# Patient Record
Sex: MALE | Race: WHITE | NOT HISPANIC OR LATINO | Employment: OTHER | URBAN - METROPOLITAN AREA
[De-identification: names, ages, dates, MRNs, and addresses within clinical notes are randomized per-mention and may not be internally consistent; named-entity substitution may affect disease eponyms.]

---

## 2017-05-23 ENCOUNTER — APPOINTMENT (OUTPATIENT)
Dept: CT IMAGING | Facility: HOSPITAL | Age: 52
End: 2017-05-23

## 2017-05-23 ENCOUNTER — HOSPITAL ENCOUNTER (INPATIENT)
Facility: HOSPITAL | Age: 52
LOS: 2 days | Discharge: HOME | End: 2017-05-26
Attending: EMERGENCY MEDICINE | Admitting: FAMILY MEDICINE

## 2017-05-23 DIAGNOSIS — R11.2 NAUSEA, VOMITING, AND DIARRHEA: ICD-10-CM

## 2017-05-23 DIAGNOSIS — D72.825 BANDEMIA: ICD-10-CM

## 2017-05-23 DIAGNOSIS — K57.32 DIVERTICULITIS OF SIGMOID COLON: Primary | ICD-10-CM

## 2017-05-23 DIAGNOSIS — R19.7 NAUSEA, VOMITING, AND DIARRHEA: ICD-10-CM

## 2017-05-23 LAB
ALBUMIN SERPL-MCNC: 4.7 G/DL (ref 3.2–4.8)
ALBUMIN/GLOB SERPL: 1.4 G/DL (ref 1.5–2.5)
ALP SERPL-CCNC: 73 U/L (ref 25–100)
ALT SERPL W P-5'-P-CCNC: 42 U/L (ref 7–40)
ANION GAP SERPL CALCULATED.3IONS-SCNC: 6 MMOL/L (ref 3–11)
AST SERPL-CCNC: 38 U/L (ref 0–33)
BASOPHILS # BLD AUTO: 0.03 10*3/MM3 (ref 0–0.2)
BASOPHILS NFR BLD AUTO: 0.2 % (ref 0–1)
BILIRUB SERPL-MCNC: 0.6 MG/DL (ref 0.3–1.2)
BUN BLD-MCNC: 16 MG/DL (ref 9–23)
BUN/CREAT SERPL: 20 (ref 7–25)
CALCIUM SPEC-SCNC: 9.7 MG/DL (ref 8.7–10.4)
CHLORIDE SERPL-SCNC: 102 MMOL/L (ref 99–109)
CO2 SERPL-SCNC: 30 MMOL/L (ref 20–31)
CREAT BLD-MCNC: 0.8 MG/DL (ref 0.6–1.3)
DEPRECATED RDW RBC AUTO: 42.7 FL (ref 37–54)
EOSINOPHIL # BLD AUTO: 0.07 10*3/MM3 (ref 0.1–0.3)
EOSINOPHIL NFR BLD AUTO: 0.4 % (ref 0–3)
ERYTHROCYTE [DISTWIDTH] IN BLOOD BY AUTOMATED COUNT: 13 % (ref 11.3–14.5)
GFR SERPL CREATININE-BSD FRML MDRD: 102 ML/MIN/1.73
GLOBULIN UR ELPH-MCNC: 3.3 GM/DL
GLUCOSE BLD-MCNC: 139 MG/DL (ref 70–100)
HCT VFR BLD AUTO: 45.1 % (ref 38.9–50.9)
HGB BLD-MCNC: 15 G/DL (ref 13.1–17.5)
IMM GRANULOCYTES # BLD: 0.04 10*3/MM3 (ref 0–0.03)
IMM GRANULOCYTES NFR BLD: 0.2 % (ref 0–0.6)
LIPASE SERPL-CCNC: 28 U/L (ref 6–51)
LYMPHOCYTES # BLD AUTO: 1.14 10*3/MM3 (ref 0.6–4.8)
LYMPHOCYTES NFR BLD AUTO: 6.4 % (ref 24–44)
MCH RBC QN AUTO: 30.1 PG (ref 27–31)
MCHC RBC AUTO-ENTMCNC: 33.3 G/DL (ref 32–36)
MCV RBC AUTO: 90.6 FL (ref 80–99)
MONOCYTES # BLD AUTO: 1.14 10*3/MM3 (ref 0–1)
MONOCYTES NFR BLD AUTO: 6.4 % (ref 0–12)
NEUTROPHILS # BLD AUTO: 15.32 10*3/MM3 (ref 1.5–8.3)
NEUTROPHILS NFR BLD AUTO: 86.4 % (ref 41–71)
PLATELET # BLD AUTO: 289 10*3/MM3 (ref 150–450)
PMV BLD AUTO: 10.9 FL (ref 6–12)
POTASSIUM BLD-SCNC: 4.1 MMOL/L (ref 3.5–5.5)
PROT SERPL-MCNC: 8 G/DL (ref 5.7–8.2)
RBC # BLD AUTO: 4.98 10*6/MM3 (ref 4.2–5.76)
SODIUM BLD-SCNC: 138 MMOL/L (ref 132–146)
WBC NRBC COR # BLD: 17.74 10*3/MM3 (ref 3.5–10.8)

## 2017-05-23 PROCEDURE — 80307 DRUG TEST PRSMV CHEM ANLYZR: CPT | Performed by: NURSE PRACTITIONER

## 2017-05-23 PROCEDURE — 99284 EMERGENCY DEPT VISIT MOD MDM: CPT

## 2017-05-23 PROCEDURE — 25010000002 ONDANSETRON PER 1 MG: Performed by: EMERGENCY MEDICINE

## 2017-05-23 PROCEDURE — 0 IOPAMIDOL 61 % SOLUTION: Performed by: EMERGENCY MEDICINE

## 2017-05-23 PROCEDURE — 74177 CT ABD & PELVIS W/CONTRAST: CPT

## 2017-05-23 PROCEDURE — 25010000002 DICYCLOMINE PER 20 MG: Performed by: EMERGENCY MEDICINE

## 2017-05-23 PROCEDURE — 85025 COMPLETE CBC W/AUTO DIFF WBC: CPT | Performed by: EMERGENCY MEDICINE

## 2017-05-23 PROCEDURE — 80053 COMPREHEN METABOLIC PANEL: CPT | Performed by: EMERGENCY MEDICINE

## 2017-05-23 PROCEDURE — 83690 ASSAY OF LIPASE: CPT | Performed by: EMERGENCY MEDICINE

## 2017-05-23 PROCEDURE — 25010000002 KETOROLAC TROMETHAMINE PER 15 MG: Performed by: EMERGENCY MEDICINE

## 2017-05-23 RX ORDER — KETOROLAC TROMETHAMINE 15 MG/ML
15 INJECTION, SOLUTION INTRAMUSCULAR; INTRAVENOUS ONCE
Status: COMPLETED | OUTPATIENT
Start: 2017-05-23 | End: 2017-05-23

## 2017-05-23 RX ORDER — SODIUM CHLORIDE 0.9 % (FLUSH) 0.9 %
10 SYRINGE (ML) INJECTION AS NEEDED
Status: DISCONTINUED | OUTPATIENT
Start: 2017-05-23 | End: 2017-05-26 | Stop reason: HOSPADM

## 2017-05-23 RX ORDER — FINASTERIDE 5 MG/1
TABLET, FILM COATED ORAL DAILY
COMMUNITY

## 2017-05-23 RX ORDER — NAPROXEN 500 MG/1
500 TABLET ORAL 2 TIMES DAILY WITH MEALS
COMMUNITY
End: 2023-02-07

## 2017-05-23 RX ORDER — FAMOTIDINE 10 MG/ML
20 INJECTION, SOLUTION INTRAVENOUS ONCE
Status: COMPLETED | OUTPATIENT
Start: 2017-05-23 | End: 2017-05-23

## 2017-05-23 RX ORDER — SODIUM PHOSPHATE,MONO-DIBASIC 19G-7G/118
ENEMA (ML) RECTAL DAILY
COMMUNITY
End: 2020-01-30

## 2017-05-23 RX ORDER — DICYCLOMINE HYDROCHLORIDE 10 MG/ML
20 INJECTION INTRAMUSCULAR ONCE
Status: COMPLETED | OUTPATIENT
Start: 2017-05-23 | End: 2017-05-23

## 2017-05-23 RX ORDER — AMITRIPTYLINE HYDROCHLORIDE 10 MG/1
12.5 TABLET, FILM COATED ORAL NIGHTLY PRN
COMMUNITY

## 2017-05-23 RX ORDER — ONDANSETRON 2 MG/ML
8 INJECTION INTRAMUSCULAR; INTRAVENOUS ONCE
Status: COMPLETED | OUTPATIENT
Start: 2017-05-23 | End: 2017-05-23

## 2017-05-23 RX ORDER — PRAVASTATIN SODIUM 40 MG
40 TABLET ORAL DAILY
COMMUNITY

## 2017-05-23 RX ADMIN — KETOROLAC TROMETHAMINE 15 MG: 15 INJECTION, SOLUTION INTRAMUSCULAR; INTRAVENOUS at 23:20

## 2017-05-23 RX ADMIN — FAMOTIDINE 20 MG: 10 INJECTION, SOLUTION INTRAVENOUS at 23:24

## 2017-05-23 RX ADMIN — ONDANSETRON 8 MG: 2 INJECTION INTRAMUSCULAR; INTRAVENOUS at 23:22

## 2017-05-23 RX ADMIN — DICYCLOMINE HYDROCHLORIDE 20 MG: 20 INJECTION, SOLUTION INTRAMUSCULAR at 23:25

## 2017-05-23 RX ADMIN — IOPAMIDOL 95 ML: 612 INJECTION, SOLUTION INTRAVENOUS at 23:56

## 2017-05-23 RX ADMIN — SODIUM CHLORIDE 1000 ML: 9 INJECTION, SOLUTION INTRAVENOUS at 23:20

## 2017-05-24 ENCOUNTER — APPOINTMENT (OUTPATIENT)
Dept: GENERAL RADIOLOGY | Facility: HOSPITAL | Age: 52
End: 2017-05-24

## 2017-05-24 PROBLEM — K57.32 DIVERTICULITIS OF SIGMOID COLON: Status: ACTIVE | Noted: 2017-05-24

## 2017-05-24 PROBLEM — M19.90 ARTHRITIS: Chronic | Status: ACTIVE | Noted: 2017-05-24

## 2017-05-24 PROBLEM — R73.02 IGT (IMPAIRED GLUCOSE TOLERANCE): Status: ACTIVE | Noted: 2017-05-24

## 2017-05-24 PROBLEM — R74.8 ELEVATED LIVER ENZYMES: Status: ACTIVE | Noted: 2017-05-24

## 2017-05-24 PROBLEM — F10.90 ALCOHOL USE: Chronic | Status: ACTIVE | Noted: 2017-05-24

## 2017-05-24 PROBLEM — D72.829 LEUKOCYTOSIS: Status: ACTIVE | Noted: 2017-05-24

## 2017-05-24 PROBLEM — E78.5 HYPERLIPIDEMIA: Chronic | Status: ACTIVE | Noted: 2017-05-24

## 2017-05-24 PROBLEM — Z72.0 TOBACCO USE: Status: ACTIVE | Noted: 2017-05-24

## 2017-05-24 PROBLEM — R73.9 HYPERGLYCEMIA: Status: ACTIVE | Noted: 2017-05-24

## 2017-05-24 PROBLEM — Z78.9 ALCOHOL USE: Chronic | Status: ACTIVE | Noted: 2017-05-24

## 2017-05-24 LAB
ALBUMIN SERPL-MCNC: 3.8 G/DL (ref 3.2–4.8)
ALBUMIN/GLOB SERPL: 1.5 G/DL (ref 1.5–2.5)
ALP SERPL-CCNC: 56 U/L (ref 25–100)
ALT SERPL W P-5'-P-CCNC: 35 U/L (ref 7–40)
AMPHET+METHAMPHET UR QL: NEGATIVE
AMPHETAMINES UR QL: NEGATIVE
ANION GAP SERPL CALCULATED.3IONS-SCNC: 3 MMOL/L (ref 3–11)
APTT PPP: 29.4 SECONDS (ref 24–31)
AST SERPL-CCNC: 28 U/L (ref 0–33)
BARBITURATES UR QL SCN: NEGATIVE
BASOPHILS # BLD AUTO: 0.02 10*3/MM3 (ref 0–0.2)
BASOPHILS NFR BLD AUTO: 0.1 % (ref 0–1)
BENZODIAZ UR QL SCN: NEGATIVE
BILIRUB SERPL-MCNC: 0.7 MG/DL (ref 0.3–1.2)
BILIRUB UR QL STRIP: NEGATIVE
BNP SERPL-MCNC: 17 PG/ML (ref 0–100)
BUN BLD-MCNC: 13 MG/DL (ref 9–23)
BUN/CREAT SERPL: 21.7 (ref 7–25)
BUPRENORPHINE SERPL-MCNC: NEGATIVE NG/ML
CALCIUM SPEC-SCNC: 8.4 MG/DL (ref 8.7–10.4)
CANNABINOIDS SERPL QL: NEGATIVE
CHLORIDE SERPL-SCNC: 106 MMOL/L (ref 99–109)
CK SERPL-CCNC: 308 U/L (ref 26–174)
CLARITY UR: CLEAR
CO2 SERPL-SCNC: 28 MMOL/L (ref 20–31)
COCAINE UR QL: NEGATIVE
COLOR UR: YELLOW
CREAT BLD-MCNC: 0.6 MG/DL (ref 0.6–1.3)
D-LACTATE SERPL-SCNC: 1 MMOL/L (ref 0.5–2)
D-LACTATE SERPL-SCNC: 1.5 MMOL/L (ref 0.5–2)
DEPRECATED RDW RBC AUTO: 43.4 FL (ref 37–54)
EOSINOPHIL # BLD AUTO: 0 10*3/MM3 (ref 0.1–0.3)
EOSINOPHIL NFR BLD AUTO: 0 % (ref 0–3)
ERYTHROCYTE [DISTWIDTH] IN BLOOD BY AUTOMATED COUNT: 13 % (ref 11.3–14.5)
ETHANOL BLD-MCNC: <10 MG/DL (ref 0–10)
FOLATE SERPL-MCNC: 22.58 NG/ML (ref 3.2–20)
GFR SERPL CREATININE-BSD FRML MDRD: 141 ML/MIN/1.73
GLOBULIN UR ELPH-MCNC: 2.5 GM/DL
GLUCOSE BLD-MCNC: 119 MG/DL (ref 70–100)
GLUCOSE UR STRIP-MCNC: NEGATIVE MG/DL
HBA1C MFR BLD: 4.9 % (ref 4.8–5.6)
HCT VFR BLD AUTO: 40.4 % (ref 38.9–50.9)
HGB BLD-MCNC: 13.2 G/DL (ref 13.1–17.5)
HGB UR QL STRIP.AUTO: NEGATIVE
HOLD SPECIMEN: NORMAL
IMM GRANULOCYTES # BLD: 0.03 10*3/MM3 (ref 0–0.03)
IMM GRANULOCYTES NFR BLD: 0.2 % (ref 0–0.6)
INR PPP: 1.04
KETONES UR QL STRIP: ABNORMAL
LEUKOCYTE ESTERASE UR QL STRIP.AUTO: NEGATIVE
LYMPHOCYTES # BLD AUTO: 1.05 10*3/MM3 (ref 0.6–4.8)
LYMPHOCYTES NFR BLD AUTO: 7.5 % (ref 24–44)
MAGNESIUM SERPL-MCNC: 1.9 MG/DL (ref 1.3–2.7)
MCH RBC QN AUTO: 29.7 PG (ref 27–31)
MCHC RBC AUTO-ENTMCNC: 32.7 G/DL (ref 32–36)
MCV RBC AUTO: 90.8 FL (ref 80–99)
METHADONE UR QL SCN: NEGATIVE
MONOCYTES # BLD AUTO: 1.01 10*3/MM3 (ref 0–1)
MONOCYTES NFR BLD AUTO: 7.2 % (ref 0–12)
NEUTROPHILS # BLD AUTO: 11.9 10*3/MM3 (ref 1.5–8.3)
NEUTROPHILS NFR BLD AUTO: 85 % (ref 41–71)
NITRITE UR QL STRIP: NEGATIVE
OPIATES UR QL: NEGATIVE
OXYCODONE UR QL SCN: NEGATIVE
PCP UR QL SCN: NEGATIVE
PH UR STRIP.AUTO: 6.5 [PH] (ref 5–8)
PLATELET # BLD AUTO: 225 10*3/MM3 (ref 150–450)
PMV BLD AUTO: 10.5 FL (ref 6–12)
POTASSIUM BLD-SCNC: 4.2 MMOL/L (ref 3.5–5.5)
PROPOXYPH UR QL: NEGATIVE
PROT SERPL-MCNC: 6.3 G/DL (ref 5.7–8.2)
PROT UR QL STRIP: NEGATIVE
PROTHROMBIN TIME: 11.4 SECONDS (ref 9.6–11.5)
RBC # BLD AUTO: 4.45 10*6/MM3 (ref 4.2–5.76)
SODIUM BLD-SCNC: 137 MMOL/L (ref 132–146)
SP GR UR STRIP: 1.03 (ref 1–1.03)
TRICYCLICS UR QL SCN: NEGATIVE
TROPONIN I SERPL-MCNC: <0.006 NG/ML
TSH SERPL DL<=0.05 MIU/L-ACNC: 0.86 MIU/ML (ref 0.35–5.35)
UROBILINOGEN UR QL STRIP: ABNORMAL
VIT B12 BLD-MCNC: 345 PG/ML (ref 211–911)
WBC NRBC COR # BLD: 14.01 10*3/MM3 (ref 3.5–10.8)
WHOLE BLOOD HOLD SPECIMEN: NORMAL
WHOLE BLOOD HOLD SPECIMEN: NORMAL

## 2017-05-24 PROCEDURE — 25010000002 THIAMINE PER 100 MG: Performed by: NURSE PRACTITIONER

## 2017-05-24 PROCEDURE — 82746 ASSAY OF FOLIC ACID SERUM: CPT | Performed by: NURSE PRACTITIONER

## 2017-05-24 PROCEDURE — 71010 HC CHEST PA OR AP: CPT

## 2017-05-24 PROCEDURE — 84425 ASSAY OF VITAMIN B-1: CPT | Performed by: NURSE PRACTITIONER

## 2017-05-24 PROCEDURE — 85610 PROTHROMBIN TIME: CPT | Performed by: NURSE PRACTITIONER

## 2017-05-24 PROCEDURE — 80306 DRUG TEST PRSMV INSTRMNT: CPT | Performed by: NURSE PRACTITIONER

## 2017-05-24 PROCEDURE — 80053 COMPREHEN METABOLIC PANEL: CPT | Performed by: NURSE PRACTITIONER

## 2017-05-24 PROCEDURE — 83036 HEMOGLOBIN GLYCOSYLATED A1C: CPT | Performed by: NURSE PRACTITIONER

## 2017-05-24 PROCEDURE — 85730 THROMBOPLASTIN TIME PARTIAL: CPT | Performed by: NURSE PRACTITIONER

## 2017-05-24 PROCEDURE — 99223 1ST HOSP IP/OBS HIGH 75: CPT | Performed by: INTERNAL MEDICINE

## 2017-05-24 PROCEDURE — 87076 CULTURE ANAEROBE IDENT EACH: CPT | Performed by: NURSE PRACTITIONER

## 2017-05-24 PROCEDURE — 93010 ELECTROCARDIOGRAM REPORT: CPT | Performed by: INTERNAL MEDICINE

## 2017-05-24 PROCEDURE — 25010000002 MORPHINE PER 10 MG

## 2017-05-24 PROCEDURE — 25010000002 LEVOFLOXACIN PER 250 MG: Performed by: NURSE PRACTITIONER

## 2017-05-24 PROCEDURE — 84443 ASSAY THYROID STIM HORMONE: CPT | Performed by: NURSE PRACTITIONER

## 2017-05-24 PROCEDURE — 84484 ASSAY OF TROPONIN QUANT: CPT | Performed by: NURSE PRACTITIONER

## 2017-05-24 PROCEDURE — 87040 BLOOD CULTURE FOR BACTERIA: CPT | Performed by: NURSE PRACTITIONER

## 2017-05-24 PROCEDURE — 93005 ELECTROCARDIOGRAM TRACING: CPT | Performed by: NURSE PRACTITIONER

## 2017-05-24 PROCEDURE — 81003 URINALYSIS AUTO W/O SCOPE: CPT | Performed by: EMERGENCY MEDICINE

## 2017-05-24 PROCEDURE — 25010000002 LEVOFLOXACIN PER 250 MG: Performed by: EMERGENCY MEDICINE

## 2017-05-24 PROCEDURE — 83880 ASSAY OF NATRIURETIC PEPTIDE: CPT | Performed by: NURSE PRACTITIONER

## 2017-05-24 PROCEDURE — 82607 VITAMIN B-12: CPT | Performed by: NURSE PRACTITIONER

## 2017-05-24 PROCEDURE — 83735 ASSAY OF MAGNESIUM: CPT | Performed by: NURSE PRACTITIONER

## 2017-05-24 PROCEDURE — 25010000002 MORPHINE SULFATE (PF) 2 MG/ML SOLUTION: Performed by: NURSE PRACTITIONER

## 2017-05-24 PROCEDURE — 83605 ASSAY OF LACTIC ACID: CPT | Performed by: NURSE PRACTITIONER

## 2017-05-24 PROCEDURE — 25010000002 MAGNESIUM SULFATE PER 500 MG OF MAGNESIUM: Performed by: NURSE PRACTITIONER

## 2017-05-24 PROCEDURE — 82550 ASSAY OF CK (CPK): CPT | Performed by: NURSE PRACTITIONER

## 2017-05-24 PROCEDURE — 85025 COMPLETE CBC W/AUTO DIFF WBC: CPT | Performed by: NURSE PRACTITIONER

## 2017-05-24 PROCEDURE — 25010000002 KETOROLAC TROMETHAMINE PER 15 MG: Performed by: NURSE PRACTITIONER

## 2017-05-24 RX ORDER — SODIUM CHLORIDE 9 MG/ML
125 INJECTION, SOLUTION INTRAVENOUS CONTINUOUS
Status: DISCONTINUED | OUTPATIENT
Start: 2017-05-24 | End: 2017-05-26 | Stop reason: HOSPADM

## 2017-05-24 RX ORDER — KETOROLAC TROMETHAMINE 15 MG/ML
15 INJECTION, SOLUTION INTRAMUSCULAR; INTRAVENOUS EVERY 6 HOURS
Status: DISCONTINUED | OUTPATIENT
Start: 2017-05-24 | End: 2017-05-26 | Stop reason: HOSPADM

## 2017-05-24 RX ORDER — SACCHAROMYCES BOULARDII 250 MG
250 CAPSULE ORAL 2 TIMES DAILY
Status: DISCONTINUED | OUTPATIENT
Start: 2017-05-24 | End: 2017-05-26 | Stop reason: HOSPADM

## 2017-05-24 RX ORDER — MORPHINE SULFATE 4 MG/ML
INJECTION, SOLUTION INTRAMUSCULAR; INTRAVENOUS
Status: COMPLETED
Start: 2017-05-24 | End: 2017-05-24

## 2017-05-24 RX ORDER — PRAVASTATIN SODIUM 40 MG
40 TABLET ORAL NIGHTLY
Status: DISCONTINUED | OUTPATIENT
Start: 2017-05-24 | End: 2017-05-26 | Stop reason: HOSPADM

## 2017-05-24 RX ORDER — DOCUSATE SODIUM 100 MG/1
100 CAPSULE, LIQUID FILLED ORAL 2 TIMES DAILY PRN
Status: DISCONTINUED | OUTPATIENT
Start: 2017-05-24 | End: 2017-05-26 | Stop reason: HOSPADM

## 2017-05-24 RX ORDER — SODIUM CHLORIDE 0.9 % (FLUSH) 0.9 %
1-10 SYRINGE (ML) INJECTION AS NEEDED
Status: DISCONTINUED | OUTPATIENT
Start: 2017-05-24 | End: 2017-05-26 | Stop reason: HOSPADM

## 2017-05-24 RX ORDER — MORPHINE SULFATE 2 MG/ML
2 INJECTION, SOLUTION INTRAMUSCULAR; INTRAVENOUS
Status: DISCONTINUED | OUTPATIENT
Start: 2017-05-24 | End: 2017-05-26 | Stop reason: HOSPADM

## 2017-05-24 RX ORDER — MORPHINE SULFATE 4 MG/ML
4 INJECTION, SOLUTION INTRAMUSCULAR; INTRAVENOUS ONCE
Status: COMPLETED | OUTPATIENT
Start: 2017-05-24 | End: 2017-05-24

## 2017-05-24 RX ORDER — LEVOFLOXACIN 5 MG/ML
500 INJECTION, SOLUTION INTRAVENOUS ONCE
Status: COMPLETED | OUTPATIENT
Start: 2017-05-24 | End: 2017-05-24

## 2017-05-24 RX ORDER — FAMOTIDINE 20 MG/1
20 TABLET, FILM COATED ORAL 2 TIMES DAILY PRN
Status: DISCONTINUED | OUTPATIENT
Start: 2017-05-24 | End: 2017-05-26 | Stop reason: HOSPADM

## 2017-05-24 RX ORDER — AMITRIPTYLINE HYDROCHLORIDE 10 MG/1
10 TABLET, FILM COATED ORAL NIGHTLY PRN
Status: DISCONTINUED | OUTPATIENT
Start: 2017-05-24 | End: 2017-05-26 | Stop reason: HOSPADM

## 2017-05-24 RX ORDER — SIMETHICONE 80 MG
80 TABLET,CHEWABLE ORAL 4 TIMES DAILY PRN
Status: DISCONTINUED | OUTPATIENT
Start: 2017-05-24 | End: 2017-05-26 | Stop reason: HOSPADM

## 2017-05-24 RX ORDER — PROMETHAZINE HYDROCHLORIDE 12.5 MG/1
12.5 SUPPOSITORY RECTAL EVERY 6 HOURS PRN
Status: DISCONTINUED | OUTPATIENT
Start: 2017-05-24 | End: 2017-05-26 | Stop reason: HOSPADM

## 2017-05-24 RX ORDER — ONDANSETRON 2 MG/ML
4 INJECTION INTRAMUSCULAR; INTRAVENOUS EVERY 6 HOURS PRN
Status: DISCONTINUED | OUTPATIENT
Start: 2017-05-24 | End: 2017-05-26 | Stop reason: HOSPADM

## 2017-05-24 RX ORDER — PROMETHAZINE HYDROCHLORIDE 12.5 MG/1
12.5 TABLET ORAL EVERY 6 HOURS PRN
Status: DISCONTINUED | OUTPATIENT
Start: 2017-05-24 | End: 2017-05-26 | Stop reason: HOSPADM

## 2017-05-24 RX ORDER — ACETAMINOPHEN 500 MG
500 TABLET ORAL EVERY 6 HOURS PRN
Status: DISCONTINUED | OUTPATIENT
Start: 2017-05-24 | End: 2017-05-26 | Stop reason: HOSPADM

## 2017-05-24 RX ORDER — MORPHINE SULFATE 4 MG/ML
4 INJECTION, SOLUTION INTRAMUSCULAR; INTRAVENOUS
Status: DISCONTINUED | OUTPATIENT
Start: 2017-05-24 | End: 2017-05-26 | Stop reason: HOSPADM

## 2017-05-24 RX ORDER — LEVOFLOXACIN 5 MG/ML
750 INJECTION, SOLUTION INTRAVENOUS EVERY 24 HOURS
Status: DISCONTINUED | OUTPATIENT
Start: 2017-05-24 | End: 2017-05-25

## 2017-05-24 RX ORDER — PROMETHAZINE HYDROCHLORIDE 25 MG/ML
12.5 INJECTION, SOLUTION INTRAMUSCULAR; INTRAVENOUS EVERY 6 HOURS PRN
Status: DISCONTINUED | OUTPATIENT
Start: 2017-05-24 | End: 2017-05-26 | Stop reason: HOSPADM

## 2017-05-24 RX ORDER — PANTOPRAZOLE SODIUM 40 MG/1
40 TABLET, DELAYED RELEASE ORAL
Status: DISCONTINUED | OUTPATIENT
Start: 2017-05-24 | End: 2017-05-26 | Stop reason: HOSPADM

## 2017-05-24 RX ADMIN — METRONIDAZOLE 500 MG: 500 INJECTION, SOLUTION INTRAVENOUS at 02:11

## 2017-05-24 RX ADMIN — METRONIDAZOLE 500 MG: 500 INJECTION, SOLUTION INTRAVENOUS at 18:36

## 2017-05-24 RX ADMIN — THIAMINE HYDROCHLORIDE 100 ML/HR: 100 INJECTION, SOLUTION INTRAMUSCULAR; INTRAVENOUS at 09:31

## 2017-05-24 RX ADMIN — FAMOTIDINE 20 MG: 20 TABLET ORAL at 15:07

## 2017-05-24 RX ADMIN — LEVOFLOXACIN 750 MG: 750 INJECTION, SOLUTION INTRAVENOUS at 20:27

## 2017-05-24 RX ADMIN — KETOROLAC TROMETHAMINE 15 MG: 15 INJECTION, SOLUTION INTRAMUSCULAR; INTRAVENOUS at 11:48

## 2017-05-24 RX ADMIN — KETOROLAC TROMETHAMINE 15 MG: 15 INJECTION, SOLUTION INTRAMUSCULAR; INTRAVENOUS at 18:35

## 2017-05-24 RX ADMIN — KETOROLAC TROMETHAMINE 15 MG: 15 INJECTION, SOLUTION INTRAMUSCULAR; INTRAVENOUS at 06:16

## 2017-05-24 RX ADMIN — PRAVASTATIN SODIUM 40 MG: 40 TABLET ORAL at 20:27

## 2017-05-24 RX ADMIN — LEVOFLOXACIN 500 MG: 5 INJECTION, SOLUTION INTRAVENOUS at 00:42

## 2017-05-24 RX ADMIN — SODIUM CHLORIDE 1000 ML: 9 INJECTION, SOLUTION INTRAVENOUS at 01:58

## 2017-05-24 RX ADMIN — Medication 250 MG: at 18:36

## 2017-05-24 RX ADMIN — MORPHINE SULFATE 2 MG: 2 INJECTION, SOLUTION INTRAMUSCULAR; INTRAVENOUS at 11:49

## 2017-05-24 RX ADMIN — Medication 250 MG: at 09:30

## 2017-05-24 RX ADMIN — KETOROLAC TROMETHAMINE 15 MG: 15 INJECTION, SOLUTION INTRAMUSCULAR; INTRAVENOUS at 23:28

## 2017-05-24 RX ADMIN — METRONIDAZOLE 500 MG: 500 INJECTION, SOLUTION INTRAVENOUS at 09:31

## 2017-05-24 RX ADMIN — PANTOPRAZOLE SODIUM 40 MG: 40 TABLET, DELAYED RELEASE ORAL at 06:16

## 2017-05-24 RX ADMIN — ACETAMINOPHEN 500 MG: 500 TABLET ORAL at 06:16

## 2017-05-24 RX ADMIN — MORPHINE SULFATE 4 MG: 4 INJECTION, SOLUTION INTRAMUSCULAR; INTRAVENOUS at 00:39

## 2017-05-24 RX ADMIN — MORPHINE SULFATE 2 MG: 2 INJECTION, SOLUTION INTRAMUSCULAR; INTRAVENOUS at 02:54

## 2017-05-24 RX ADMIN — SODIUM CHLORIDE 125 ML/HR: 9 INJECTION, SOLUTION INTRAVENOUS at 03:12

## 2017-05-24 RX ADMIN — SODIUM CHLORIDE 125 ML/HR: 9 INJECTION, SOLUTION INTRAVENOUS at 15:07

## 2017-05-25 LAB
ANION GAP SERPL CALCULATED.3IONS-SCNC: 6 MMOL/L (ref 3–11)
BASOPHILS # BLD AUTO: 0.01 10*3/MM3 (ref 0–0.2)
BASOPHILS NFR BLD AUTO: 0.1 % (ref 0–1)
BUN BLD-MCNC: 10 MG/DL (ref 9–23)
BUN/CREAT SERPL: 14.3 (ref 7–25)
CALCIUM SPEC-SCNC: 8.5 MG/DL (ref 8.7–10.4)
CHLORIDE SERPL-SCNC: 107 MMOL/L (ref 99–109)
CO2 SERPL-SCNC: 26 MMOL/L (ref 20–31)
CREAT BLD-MCNC: 0.7 MG/DL (ref 0.6–1.3)
DEPRECATED RDW RBC AUTO: 44.7 FL (ref 37–54)
EOSINOPHIL # BLD AUTO: 0.07 10*3/MM3 (ref 0.1–0.3)
EOSINOPHIL NFR BLD AUTO: 0.7 % (ref 0–3)
ERYTHROCYTE [DISTWIDTH] IN BLOOD BY AUTOMATED COUNT: 13.4 % (ref 11.3–14.5)
GFR SERPL CREATININE-BSD FRML MDRD: 118 ML/MIN/1.73
GLUCOSE BLD-MCNC: 92 MG/DL (ref 70–100)
HCT VFR BLD AUTO: 37.3 % (ref 38.9–50.9)
HGB BLD-MCNC: 12 G/DL (ref 13.1–17.5)
IMM GRANULOCYTES # BLD: 0.02 10*3/MM3 (ref 0–0.03)
IMM GRANULOCYTES NFR BLD: 0.2 % (ref 0–0.6)
LYMPHOCYTES # BLD AUTO: 0.87 10*3/MM3 (ref 0.6–4.8)
LYMPHOCYTES NFR BLD AUTO: 9.1 % (ref 24–44)
MCH RBC QN AUTO: 29.6 PG (ref 27–31)
MCHC RBC AUTO-ENTMCNC: 32.2 G/DL (ref 32–36)
MCV RBC AUTO: 91.9 FL (ref 80–99)
MONOCYTES # BLD AUTO: 0.64 10*3/MM3 (ref 0–1)
MONOCYTES NFR BLD AUTO: 6.7 % (ref 0–12)
NEUTROPHILS # BLD AUTO: 7.91 10*3/MM3 (ref 1.5–8.3)
NEUTROPHILS NFR BLD AUTO: 83.2 % (ref 41–71)
PLATELET # BLD AUTO: 222 10*3/MM3 (ref 150–450)
PMV BLD AUTO: 10.6 FL (ref 6–12)
POTASSIUM BLD-SCNC: 3.9 MMOL/L (ref 3.5–5.5)
RBC # BLD AUTO: 4.06 10*6/MM3 (ref 4.2–5.76)
SODIUM BLD-SCNC: 139 MMOL/L (ref 132–146)
WBC NRBC COR # BLD: 9.52 10*3/MM3 (ref 3.5–10.8)

## 2017-05-25 PROCEDURE — 99231 SBSQ HOSP IP/OBS SF/LOW 25: CPT | Performed by: FAMILY MEDICINE

## 2017-05-25 PROCEDURE — 25010000002 MORPHINE SULFATE (PF) 2 MG/ML SOLUTION: Performed by: NURSE PRACTITIONER

## 2017-05-25 PROCEDURE — 25010000002 KETOROLAC TROMETHAMINE PER 15 MG: Performed by: NURSE PRACTITIONER

## 2017-05-25 PROCEDURE — 80048 BASIC METABOLIC PNL TOTAL CA: CPT | Performed by: FAMILY MEDICINE

## 2017-05-25 PROCEDURE — 85025 COMPLETE CBC W/AUTO DIFF WBC: CPT | Performed by: FAMILY MEDICINE

## 2017-05-25 RX ORDER — METRONIDAZOLE 500 MG/1
500 TABLET ORAL EVERY 8 HOURS SCHEDULED
Status: DISCONTINUED | OUTPATIENT
Start: 2017-05-25 | End: 2017-05-26 | Stop reason: HOSPADM

## 2017-05-25 RX ORDER — LEVOFLOXACIN 750 MG/1
750 TABLET ORAL EVERY 24 HOURS
Status: DISCONTINUED | OUTPATIENT
Start: 2017-05-25 | End: 2017-05-26 | Stop reason: HOSPADM

## 2017-05-25 RX ADMIN — SODIUM CHLORIDE 125 ML/HR: 9 INJECTION, SOLUTION INTRAVENOUS at 11:24

## 2017-05-25 RX ADMIN — Medication 250 MG: at 09:26

## 2017-05-25 RX ADMIN — MORPHINE SULFATE 2 MG: 2 INJECTION, SOLUTION INTRAMUSCULAR; INTRAVENOUS at 00:25

## 2017-05-25 RX ADMIN — METRONIDAZOLE 500 MG: 500 INJECTION, SOLUTION INTRAVENOUS at 02:10

## 2017-05-25 RX ADMIN — PANTOPRAZOLE SODIUM 40 MG: 40 TABLET, DELAYED RELEASE ORAL at 06:09

## 2017-05-25 RX ADMIN — SODIUM CHLORIDE 125 ML/HR: 9 INJECTION, SOLUTION INTRAVENOUS at 21:03

## 2017-05-25 RX ADMIN — KETOROLAC TROMETHAMINE 15 MG: 15 INJECTION, SOLUTION INTRAMUSCULAR; INTRAVENOUS at 23:32

## 2017-05-25 RX ADMIN — METRONIDAZOLE 500 MG: 500 INJECTION, SOLUTION INTRAVENOUS at 09:22

## 2017-05-25 RX ADMIN — PRAVASTATIN SODIUM 40 MG: 40 TABLET ORAL at 21:02

## 2017-05-25 RX ADMIN — METRONIDAZOLE 500 MG: 500 TABLET ORAL at 21:02

## 2017-05-25 RX ADMIN — KETOROLAC TROMETHAMINE 15 MG: 15 INJECTION, SOLUTION INTRAMUSCULAR; INTRAVENOUS at 06:09

## 2017-05-25 RX ADMIN — METRONIDAZOLE 500 MG: 500 TABLET ORAL at 14:20

## 2017-05-25 RX ADMIN — SODIUM CHLORIDE 125 ML/HR: 9 INJECTION, SOLUTION INTRAVENOUS at 00:25

## 2017-05-25 RX ADMIN — KETOROLAC TROMETHAMINE 15 MG: 15 INJECTION, SOLUTION INTRAMUSCULAR; INTRAVENOUS at 17:45

## 2017-05-25 RX ADMIN — Medication 250 MG: at 17:45

## 2017-05-25 RX ADMIN — LEVOFLOXACIN 750 MG: 750 TABLET, FILM COATED ORAL at 11:31

## 2017-05-25 RX ADMIN — KETOROLAC TROMETHAMINE 15 MG: 15 INJECTION, SOLUTION INTRAMUSCULAR; INTRAVENOUS at 12:46

## 2017-05-26 VITALS
BODY MASS INDEX: 30.04 KG/M2 | OXYGEN SATURATION: 97 % | WEIGHT: 214.6 LBS | HEIGHT: 71 IN | HEART RATE: 94 BPM | RESPIRATION RATE: 18 BRPM | DIASTOLIC BLOOD PRESSURE: 94 MMHG | SYSTOLIC BLOOD PRESSURE: 128 MMHG | TEMPERATURE: 98.5 F

## 2017-05-26 LAB — VIT B1 BLD-SCNC: 216.7 NMOL/L (ref 66.5–200)

## 2017-05-26 PROCEDURE — 99238 HOSP IP/OBS DSCHRG MGMT 30/<: CPT | Performed by: FAMILY MEDICINE

## 2017-05-26 PROCEDURE — 25010000002 KETOROLAC TROMETHAMINE PER 15 MG: Performed by: NURSE PRACTITIONER

## 2017-05-26 PROCEDURE — 87040 BLOOD CULTURE FOR BACTERIA: CPT | Performed by: FAMILY MEDICINE

## 2017-05-26 RX ORDER — METRONIDAZOLE 500 MG/1
500 TABLET ORAL EVERY 8 HOURS SCHEDULED
Qty: 36 TABLET | Refills: 0 | Status: SHIPPED | OUTPATIENT
Start: 2017-05-26 | End: 2017-06-07

## 2017-05-26 RX ORDER — SACCHAROMYCES BOULARDII 250 MG
250 CAPSULE ORAL 2 TIMES DAILY
Qty: 28 CAPSULE | Refills: 0 | Status: SHIPPED | OUTPATIENT
Start: 2017-05-26 | End: 2017-06-09

## 2017-05-26 RX ORDER — LEVOFLOXACIN 750 MG/1
750 TABLET ORAL EVERY 24 HOURS
Qty: 12 TABLET | Refills: 0 | Status: SHIPPED | OUTPATIENT
Start: 2017-05-26 | End: 2017-06-07

## 2017-05-26 RX ADMIN — KETOROLAC TROMETHAMINE 15 MG: 15 INJECTION, SOLUTION INTRAMUSCULAR; INTRAVENOUS at 05:27

## 2017-05-26 RX ADMIN — PANTOPRAZOLE SODIUM 40 MG: 40 TABLET, DELAYED RELEASE ORAL at 05:27

## 2017-05-26 RX ADMIN — METRONIDAZOLE 500 MG: 500 TABLET ORAL at 05:27

## 2017-05-26 RX ADMIN — LEVOFLOXACIN 750 MG: 750 TABLET, FILM COATED ORAL at 11:14

## 2017-05-26 RX ADMIN — Medication 250 MG: at 09:42

## 2017-05-29 LAB — BACTERIA SPEC AEROBE CULT: NORMAL

## 2017-05-30 LAB
BACTERIA SPEC AEROBE CULT: ABNORMAL
GRAM STN SPEC: ABNORMAL
ISOLATED FROM: ABNORMAL

## 2017-05-31 LAB
BACTERIA SPEC AEROBE CULT: NORMAL
BACTERIA SPEC AEROBE CULT: NORMAL

## 2017-10-31 ENCOUNTER — LAB REQUISITION (OUTPATIENT)
Dept: LAB | Facility: HOSPITAL | Age: 52
End: 2017-10-31

## 2017-10-31 ENCOUNTER — OUTSIDE FACILITY SERVICE (OUTPATIENT)
Dept: GASTROENTEROLOGY | Facility: CLINIC | Age: 52
End: 2017-10-31

## 2017-10-31 DIAGNOSIS — D12.5 BENIGN NEOPLASM OF SIGMOID COLON: ICD-10-CM

## 2017-10-31 PROCEDURE — 45385 COLONOSCOPY W/LESION REMOVAL: CPT | Performed by: INTERNAL MEDICINE

## 2017-10-31 PROCEDURE — G0500 MOD SEDAT ENDO SERVICE >5YRS: HCPCS | Performed by: INTERNAL MEDICINE

## 2017-10-31 PROCEDURE — 88305 TISSUE EXAM BY PATHOLOGIST: CPT | Performed by: INTERNAL MEDICINE

## 2017-11-01 LAB
CYTO UR: NORMAL
LAB AP CASE REPORT: NORMAL
LAB AP CLINICAL INFORMATION: NORMAL
Lab: NORMAL
PATH REPORT.FINAL DX SPEC: NORMAL
PATH REPORT.GROSS SPEC: NORMAL

## 2017-11-02 ENCOUNTER — TELEPHONE (OUTPATIENT)
Dept: GASTROENTEROLOGY | Facility: CLINIC | Age: 52
End: 2017-11-02

## 2017-11-02 NOTE — TELEPHONE ENCOUNTER
----- Message from Osmani Pitts MD sent at 11/2/2017  7:16 AM EDT -----  Please call Trae and inform him that his polyp was a benign wartlike polyp.  Follow-up colonoscopy in 10 years time is indicated.  Dr. Pitts

## 2019-09-18 ENCOUNTER — TRANSCRIBE ORDERS (OUTPATIENT)
Dept: ADMINISTRATIVE | Facility: HOSPITAL | Age: 54
End: 2019-09-18

## 2019-09-18 ENCOUNTER — HOSPITAL ENCOUNTER (OUTPATIENT)
Dept: GENERAL RADIOLOGY | Facility: HOSPITAL | Age: 54
Discharge: HOME | End: 2019-09-18
Admitting: FAMILY MEDICINE

## 2019-09-18 DIAGNOSIS — M25.552 PAIN OF LEFT HIP JOINT: Primary | ICD-10-CM

## 2019-09-18 DIAGNOSIS — M25.552 PAIN OF LEFT HIP JOINT: ICD-10-CM

## 2019-09-18 PROCEDURE — 73502 X-RAY EXAM HIP UNI 2-3 VIEWS: CPT

## 2019-12-12 ENCOUNTER — OFFICE VISIT (OUTPATIENT)
Dept: ORTHOPEDIC SURGERY | Facility: CLINIC | Age: 54
End: 2019-12-12

## 2019-12-12 VITALS — WEIGHT: 198.41 LBS | HEIGHT: 71 IN | HEART RATE: 87 BPM | BODY MASS INDEX: 27.78 KG/M2 | OXYGEN SATURATION: 98 %

## 2019-12-12 DIAGNOSIS — M16.12 PRIMARY OSTEOARTHRITIS OF LEFT HIP: Primary | ICD-10-CM

## 2019-12-12 DIAGNOSIS — M25.552 PAIN OF LEFT HIP JOINT: ICD-10-CM

## 2019-12-12 PROCEDURE — 99244 OFF/OP CNSLTJ NEW/EST MOD 40: CPT | Performed by: ORTHOPAEDIC SURGERY

## 2019-12-12 RX ORDER — OXYCODONE HCL 10 MG/1
10 TABLET, FILM COATED, EXTENDED RELEASE ORAL ONCE
Status: CANCELLED | OUTPATIENT
Start: 2019-12-12 | End: 2019-12-12

## 2019-12-12 RX ORDER — ACETAMINOPHEN 500 MG
1000 TABLET ORAL AS NEEDED
COMMUNITY

## 2019-12-12 RX ORDER — MELOXICAM 7.5 MG/1
15 TABLET ORAL ONCE
Status: CANCELLED | OUTPATIENT
Start: 2019-12-12 | End: 2019-12-12

## 2019-12-12 RX ORDER — ACETAMINOPHEN 325 MG/1
1000 TABLET ORAL ONCE
Status: CANCELLED | OUTPATIENT
Start: 2019-12-12 | End: 2019-12-12

## 2019-12-12 NOTE — PROGRESS NOTES
"Orthopaedic Clinic Note: Hip New Patient    Chief Complaint   Patient presents with   • Left Hip - Pain        HPI  Consult from: KARRIE Perez MD    Kameron Larsen is a 54 y.o. male who presents with left hip pain for 20 year(s). Onset onset has been atraumatic and gradual in nature. Pain is localized to groin, lateral trochanter and gluteal region and is a 7/10 on the pain scale.Pain is described as dull, aching and stabbing. Associated symptoms include pain, stiffness and giving way/buckling.  The pain is worse with walking, standing, sitting, climbing stairs and sleeping; resting improve the pain. Previous treatments have included: NSAIDS and physical therapy since symptom onset. Although some transient relief was reported with these interventions, these conservative measures have failed and symptoms have persisted. The patient is limited in daily activities and has had a significant decrease in quality of life as a result. He denies fevers, chills, or constitutional symptoms      Past Medical History:   Diagnosis Date   • Alcohol use 5/24/2017   • Allergic    • Arthritis 5/24/2017   • Diverticulitis of sigmoid colon 5/24/2017   • Elevated liver enzymes 5/24/2017   • Hyperglycemia 5/24/2017   • Hyperlipidemia    • IGT (impaired glucose tolerance) 5/24/2017   • Leukocytosis 5/24/2017      Past Surgical History:   Procedure Laterality Date   • ABDOMINAL SURGERY      pt reports hx years ago a surgery for hiatal hernia with esophageal reconstruction, \"I can't throw up\"   • FOOT SURGERY Right     Chilectomy and osteotomy- [facility]   • KNEE SURGERY Left     Medial meniscus sx      Family History   Problem Relation Age of Onset   • Heart failure Mother    • Diabetes Mother    • Stroke Mother    • Cancer Mother    • Osteoarthritis Mother    • Hypertension Father    • Heart failure Father      Social History     Socioeconomic History   • Marital status: Single     Spouse name: Not on file   • Number of children: " Not on file   • Years of education: Not on file   • Highest education level: Not on file   Tobacco Use   • Smoking status: Former Smoker     Types: Cigars   • Smokeless tobacco: Never Used   • Tobacco comment: cigar rarely   Substance and Sexual Activity   • Alcohol use: Yes     Comment: 1 daily on average   • Drug use: No   • Sexual activity: Defer   Social History Narrative    Mr. Shah is a 52 year old white  male who is currently going through a divorce with his wife. He has two children. 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 He does not have a living will or advanced directive.      Current Outpatient Medications on File Prior to Visit   Medication Sig Dispense Refill   • acetaminophen (TYLENOL) 500 MG tablet Take 500 mg by mouth As Needed for Mild Pain .     • amitriptyline (ELAVIL) 10 MG tablet Take  by mouth At Night As Needed for Sleep.     • finasteride (PROSCAR) 5 MG tablet Take 5 mg by mouth Daily.     • glucosamine-chondroitin 500-400 MG capsule capsule Take  by mouth Daily.     • Multiple Vitamins-Minerals (MULTIVITAMIN ADULT PO) Take  by mouth.     • naproxen (NAPROSYN) 500 MG tablet Take 500 mg by mouth 2 (Two) Times a Day With Meals.     • pravastatin (PRAVACHOL) 40 MG tablet Take 40 mg by mouth Daily.       No current facility-administered medications on file prior to visit.       No Known Allergies     Review of Systems   Constitutional: Positive for activity change.   HENT: Negative.    Eyes: Positive for redness.   Respiratory: Negative.    Cardiovascular: Negative.    Gastrointestinal: Negative.    Endocrine: Negative.    Genitourinary: Negative.    Musculoskeletal: Positive for arthralgias, back pain, joint swelling and neck stiffness.   Skin: Negative.    Allergic/Immunologic: Positive for environmental allergies.   Neurological: Negative.    Hematological: Negative.    Psychiatric/Behavioral: Positive for sleep disturbance (pain).        The patient's  "Review of Systems was personally reviewed and confirmed as accurate.    The following portions of the patient's history were reviewed and updated as appropriate: allergies, current medications, past family history, past medical history, past social history, past surgical history and problem list.    Physical Exam  Pulse 87, height 180.3 cm (70.98\"), weight 90 kg (198 lb 6.6 oz), SpO2 98 %.    Body mass index is 27.69 kg/m².    GENERAL APPEARANCE: awake, alert & oriented x 3, in no acute distress and well developed, well nourished  PSYCH: normal affect  LUNGS:  breathing nonlabored  EYES: sclera anicteric  CARDIOVASCULAR: palpable dorsalis pedis, palpable posterior tibial bilaterally. Capillary refill less than 2 seconds  EXTREMITIES: no clubbing, cyanosis  GAIT:  Antalgic           Right Hip Exam:  RANGE OF MOTION:   FLEXION CONTRACTURE: None   FLEXION: 110 degrees   INTERNAL ROTATION: 20 degrees at 90 degrees of flexion   EXTERNAL ROTATION: 40 degrees at 90 degrees of flexion    PAIN WITH HIP MOTION: no  PAIN WITH LOGROLL: no  STINCHFIELD TEST: negative    KNEE EXAM: full knee ROM (0-120), stable to varus/valgus stress at terminal extension and 30 degrees     STRENGTH:  5/5 hip adduction, abduction, flexion. 5/5 strength knee flexion, extension. 5/5 strength ankle dorsiflexion and plantarflexion.     GREATER TROCHANTER BURSAL PAIN:  no     REFLEXES:   PATELLAR 2+/4   ACHILLES 2+/4    CLONUS: negative  STRAIGHT LEG TEST:   negative    SENSATION TO LIGHT TOUCH:  DEEP PERONEAL/SUPERFICIAL PERONEAL/SURAL/SAPHENOUS/TIBIAL:  intact    EDEMA:   no  ERYTHEMA:  no  WOUNDS/INCISIONS: no overlying skin problems.      Left Hip Exam:   RANGE OF MOTION:   FLEXION CONTRACTURE: None  FLEXION: 100 degrees  INTERNAL ROTATION: 10 degrees at 90 degrees of flexion   EXTERNAL ROTATION: 30 degrees at 90 degrees of flexion    PAIN WITH HIP MOTION: yes  PAIN WITH LOGROLL: no  STINCHFIELD TEST: positive    KNEE EXAM: full knee ROM (0-120), " stable to varus/valgus stress at terminal extension and 30 degrees     STRENGTH:  4/5 hip adduction, abduction, flexion. 5/5 knee flexion, extension. 5/5 ankle dorsiflexion and plantarflexion.     GREATER TROCHANTER BURSAL PAIN:  yes     REFLEXES:   PATELLAR 2+/4   ACHILLES 2+/4    CLONUS: no  STRAIGHT LEG TEST:   negative    SENSATION TO LIGHT TOUCH:  DEEP PERONEAL/SUPERFICIAL PERONEAL/SURAL/SAPHENOUS/TIBIAL:  intact    EDEMA:   no  ERYTHEMA:  no  WOUNDS/INCISIONS:  no        ------------------------------------------------------------------    LEG LENGTHS:  equal  _____________________________________________________  _____________________________________________________    RADIOGRAPHIC FINDINGS:   Indication: Left hip pain    Comparison: Todays xrays were compared to previous xrays from 9/18/2019     AP pelvis, hip 2 views: Right: moderate joint space narrowing,  there are marginal osteophytes visualized at the femoral head-neck junction and acetabular margins; Left: advanced, end-stage osteoarthritis with bone on bone articulation, subchondral sclerosis, and subchondral cysts, there are marginal osteophytes visualized at the femoral head-neck junction and acetabular margins.  No significant change compared to prior radiographs.    Assessment/Plan:   Diagnosis Plan   1. Primary osteoarthritis of left hip  Case Request    CBC and Differential    Basic metabolic panel    Protime-INR    APTT    Hemoglobin A1c    Urinalysis With Culture If Indicated -    ECG 12 Lead    Nicotine & Metabolite, Quant    Tranexamic Acid 1,000 mg in sodium chloride 0.9 % 100 mL    Tranexamic Acid 1,000 mg in sodium chloride 0.9 % 100 mL    ceFAZolin (ANCEF) 2 g in sodium chloride 0.9 % 100 mL IVPB    acetaminophen (TYLENOL) tablet 975 mg    meloxicam (MOBIC) tablet 15 mg    mupirocin (BACTROBAN) 2 % nasal ointment 1 application    oxyCODONE (oxyCONTIN) 12 hr tablet 10 mg    Case Request   2. Pain of left hip joint  XR Pelvis 1 or 2 View      The patient has clinical and radiographic evidence of end-stage left hip joint degeneration. Conservative measures have been tried for 3 months or longer, but have failed to adequately treat or improve the patient's symptoms. Pain is restricting the patient's daily activities as well as quality of life. The recommendation at this time is to proceed with a left total hip arthroplasty with the goal to improve patient function and pain. The risks, benefits, potential complications, and alternatives were discussed with the patient in detail. Risks included but were not limited to bleeding, infection, anesthesia risks, damage to neurovascular structures, osteolysis, aseptic loosening, instability, dislocation, pain, continued pain, iatrogenic fracture, leg length discrepancy, possible need for future surgery including the potential for amputation, blood clots, myocardial infarction, stroke, and death. Anita-operative blood management and the potential for blood transfusion were discussed with risks and options clearly outlined. Specific details of the surgical procedure, hospitalization, recovery, rehabilitation, and long-term precautions were also presented. Pre-operative teaching was provided. Implant/prosthesis selection was outlined, and the many options available were explained; the final choice will be made at the time of the procedure to match the anatomy and condition of the bone, ligaments, tendons, and muscles. Given this instruction, the patient elected to proceed with the left total hip arthroplasty. The patient will be seen by pre-admission testing for pre-operative optimization and risk assessment and will be scheduled for surgery once this is completed.    The patient is considered standard risk for DVT based on patient risk factors and will be placed on aspirin postoperatively for DVT prophylaxis.      Dom Terry MD  12/12/19  8:25 AM

## 2020-01-08 ENCOUNTER — TELEPHONE (OUTPATIENT)
Dept: ORTHOPEDIC SURGERY | Facility: CLINIC | Age: 55
End: 2020-01-08

## 2020-01-08 NOTE — TELEPHONE ENCOUNTER
RETURNED PATIENTS CALL REGARDING NEW INSURANCE, NO ANSWER.  LEFT A MESSAGE TO RETURN MY CALL WITH NEW INSURANCE INFORMATION ASAP.

## 2020-01-30 ENCOUNTER — APPOINTMENT (OUTPATIENT)
Dept: PREADMISSION TESTING | Facility: HOSPITAL | Age: 55
End: 2020-01-30

## 2020-01-30 VITALS — BODY MASS INDEX: 28.27 KG/M2 | HEIGHT: 71 IN | WEIGHT: 201.94 LBS

## 2020-01-30 DIAGNOSIS — M16.12 PRIMARY OSTEOARTHRITIS OF LEFT HIP: ICD-10-CM

## 2020-01-30 LAB
ANION GAP SERPL CALCULATED.3IONS-SCNC: 11 MMOL/L (ref 5–15)
APTT PPP: 28.4 SECONDS (ref 24–37)
BACTERIA UR QL AUTO: NORMAL /HPF
BASOPHILS # BLD AUTO: 0.06 10*3/MM3 (ref 0–0.2)
BASOPHILS NFR BLD AUTO: 0.8 % (ref 0–1.5)
BILIRUB UR QL STRIP: NEGATIVE
BUN BLD-MCNC: 19 MG/DL (ref 6–20)
BUN/CREAT SERPL: 26.4 (ref 7–25)
CALCIUM SPEC-SCNC: 9.5 MG/DL (ref 8.6–10.5)
CHLORIDE SERPL-SCNC: 100 MMOL/L (ref 98–107)
CLARITY UR: CLEAR
CO2 SERPL-SCNC: 30 MMOL/L (ref 22–29)
COLOR UR: YELLOW
CREAT BLD-MCNC: 0.72 MG/DL (ref 0.76–1.27)
DEPRECATED RDW RBC AUTO: 40.6 FL (ref 37–54)
EOSINOPHIL # BLD AUTO: 0.19 10*3/MM3 (ref 0–0.4)
EOSINOPHIL NFR BLD AUTO: 2.7 % (ref 0.3–6.2)
ERYTHROCYTE [DISTWIDTH] IN BLOOD BY AUTOMATED COUNT: 12.3 % (ref 12.3–15.4)
GFR SERPL CREATININE-BSD FRML MDRD: 114 ML/MIN/1.73
GLUCOSE BLD-MCNC: 88 MG/DL (ref 65–99)
GLUCOSE UR STRIP-MCNC: NEGATIVE MG/DL
HBA1C MFR BLD: 5.4 % (ref 4.8–5.6)
HCT VFR BLD AUTO: 46.8 % (ref 37.5–51)
HGB BLD-MCNC: 15.6 G/DL (ref 13–17.7)
HGB UR QL STRIP.AUTO: NEGATIVE
HYALINE CASTS UR QL AUTO: NORMAL /LPF
IMM GRANULOCYTES # BLD AUTO: 0.01 10*3/MM3 (ref 0–0.05)
IMM GRANULOCYTES NFR BLD AUTO: 0.1 % (ref 0–0.5)
INR PPP: 0.98 (ref 0.85–1.16)
KETONES UR QL STRIP: NEGATIVE
LEUKOCYTE ESTERASE UR QL STRIP.AUTO: NEGATIVE
LYMPHOCYTES # BLD AUTO: 1.87 10*3/MM3 (ref 0.7–3.1)
LYMPHOCYTES NFR BLD AUTO: 26.2 % (ref 19.6–45.3)
MCH RBC QN AUTO: 30.3 PG (ref 26.6–33)
MCHC RBC AUTO-ENTMCNC: 33.3 G/DL (ref 31.5–35.7)
MCV RBC AUTO: 90.9 FL (ref 79–97)
MONOCYTES # BLD AUTO: 0.38 10*3/MM3 (ref 0.1–0.9)
MONOCYTES NFR BLD AUTO: 5.3 % (ref 5–12)
NEUTROPHILS # BLD AUTO: 4.64 10*3/MM3 (ref 1.7–7)
NEUTROPHILS NFR BLD AUTO: 64.9 % (ref 42.7–76)
NITRITE UR QL STRIP: NEGATIVE
NRBC BLD AUTO-RTO: 0 /100 WBC (ref 0–0.2)
PH UR STRIP.AUTO: 5.5 [PH] (ref 5–8)
PLATELET # BLD AUTO: 300 10*3/MM3 (ref 140–450)
PMV BLD AUTO: 10.1 FL (ref 6–12)
POTASSIUM BLD-SCNC: 4.4 MMOL/L (ref 3.5–5.2)
PROT UR QL STRIP: NEGATIVE
PROTHROMBIN TIME: 12.5 SECONDS (ref 11.2–14.3)
RBC # BLD AUTO: 5.15 10*6/MM3 (ref 4.14–5.8)
RBC # UR: NORMAL /HPF
REF LAB TEST METHOD: NORMAL
SODIUM BLD-SCNC: 141 MMOL/L (ref 136–145)
SP GR UR STRIP: 1.02 (ref 1–1.03)
SQUAMOUS #/AREA URNS HPF: NORMAL /HPF
UROBILINOGEN UR QL STRIP: NORMAL
WBC NRBC COR # BLD: 7.15 10*3/MM3 (ref 3.4–10.8)
WBC UR QL AUTO: NORMAL /HPF

## 2020-01-30 PROCEDURE — G0480 DRUG TEST DEF 1-7 CLASSES: HCPCS | Performed by: ORTHOPAEDIC SURGERY

## 2020-01-30 PROCEDURE — 81001 URINALYSIS AUTO W/SCOPE: CPT | Performed by: ORTHOPAEDIC SURGERY

## 2020-01-30 PROCEDURE — 85025 COMPLETE CBC W/AUTO DIFF WBC: CPT | Performed by: ORTHOPAEDIC SURGERY

## 2020-01-30 PROCEDURE — 85610 PROTHROMBIN TIME: CPT | Performed by: ORTHOPAEDIC SURGERY

## 2020-01-30 PROCEDURE — 93005 ELECTROCARDIOGRAM TRACING: CPT

## 2020-01-30 PROCEDURE — 36415 COLL VENOUS BLD VENIPUNCTURE: CPT

## 2020-01-30 PROCEDURE — 80048 BASIC METABOLIC PNL TOTAL CA: CPT | Performed by: ORTHOPAEDIC SURGERY

## 2020-01-30 PROCEDURE — 83036 HEMOGLOBIN GLYCOSYLATED A1C: CPT | Performed by: ORTHOPAEDIC SURGERY

## 2020-01-30 PROCEDURE — 93010 ELECTROCARDIOGRAM REPORT: CPT | Performed by: INTERNAL MEDICINE

## 2020-01-30 PROCEDURE — 85730 THROMBOPLASTIN TIME PARTIAL: CPT | Performed by: ORTHOPAEDIC SURGERY

## 2020-01-30 RX ORDER — LORATADINE 10 MG/1
10 CAPSULE, LIQUID FILLED ORAL DAILY PRN
COMMUNITY

## 2020-01-30 ASSESSMENT — HOOS JR
HOOS JR SCORE: 11
HOOS JR SCORE: 55.985

## 2020-02-03 ENCOUNTER — TELEPHONE (OUTPATIENT)
Dept: ORTHOPEDIC SURGERY | Facility: CLINIC | Age: 55
End: 2020-02-03

## 2020-02-03 NOTE — TELEPHONE ENCOUNTER
I spoke with the patient in regards to Dr. Terry not wanting him to take any anti-inflammatories a week before surgery. He advised me that the literature that we have states that he is to be off of NSAIDS such as Naproxen or Ibuprofen, but he is able to take the Celebrex. He mentioned that if this is what Dr. Terry wants then he will comply.     He also asked what his driving restrictions were after surgery and how long he will have to wait to drive. I advised that typically patients have to wait about 4-6 weeks after surgery and be off of any narcotics they are taking. He advised that he would like to discuss this with Dr. Terry fully and he may not need that much time overall due to him not wanting to take too many narcotics. I let him know that I would let Dr. Terry know this as well.    The patient also asked about the cost of surgery. I let him know that I cannot give him that information, but I would be happy to let Dr. Terry's surgery scheduler know he is asking about the cost of surgery. He understood.    Aletha

## 2020-02-03 NOTE — TELEPHONE ENCOUNTER
PATIENT CALLED  ASKING FOR A PRESCRIPTION OF CELEBREX. PATIENT STATED HE NEEDED AN ANTI- INFLAMMATORY. PATIENT WOULD LIKE THE PRESCRIPTION CALLED IN TO [facility], [phone]. PATIENT WOULD LIKE A CALL BACK [phone]

## 2020-02-03 NOTE — TELEPHONE ENCOUNTER
He is not supposed to be taking an anti-inflammatory for a week prior to his surgery because it will increase his bleeding during surgery.  We will start him on an anti-inflammatory after the surgery.

## 2020-02-03 NOTE — TELEPHONE ENCOUNTER
I left a voicemail with the patient to advise that per Dr. Terry, he is able to return to driving 1 week from surgery and if it was his driving leg, then he would have to wait 4 weeks to return to driving safely. I also left on the voicemail that he would unfortunately have to contact our billing dept in regards to his surgery bill.    Aletha

## 2020-02-03 NOTE — TELEPHONE ENCOUNTER
Naproxen and ibuprofen are examples.  Celebrex is also part of the NSAID class and therefore should not be taken prior to surgery.    In regards to driving, provided he is off narcotics and since this is his non-driving leg, he can resume driving in a week.  If it were his driving leg, 4 weeks to safely return to driving.

## 2020-02-05 LAB
COTININE UR-MCNC: NORMAL NG/ML
NICOTINE SERPL-MCNC: NORMAL NG/ML

## 2020-02-12 ENCOUNTER — ANESTHESIA EVENT (OUTPATIENT)
Dept: PERIOP | Facility: HOSPITAL | Age: 55
End: 2020-02-12

## 2020-02-12 ENCOUNTER — APPOINTMENT (OUTPATIENT)
Dept: GENERAL RADIOLOGY | Facility: HOSPITAL | Age: 55
End: 2020-02-12

## 2020-02-12 ENCOUNTER — ANESTHESIA (OUTPATIENT)
Dept: PERIOP | Facility: HOSPITAL | Age: 55
End: 2020-02-12

## 2020-02-12 ENCOUNTER — HOSPITAL ENCOUNTER (INPATIENT)
Facility: HOSPITAL | Age: 55
LOS: 1 days | Discharge: HOME HEALTH | End: 2020-02-13
Attending: ORTHOPAEDIC SURGERY | Admitting: ORTHOPAEDIC SURGERY

## 2020-02-12 DIAGNOSIS — Z96.642 STATUS POST TOTAL REPLACEMENT OF LEFT HIP: Primary | ICD-10-CM

## 2020-02-12 DIAGNOSIS — M16.12 PRIMARY OSTEOARTHRITIS OF LEFT HIP: ICD-10-CM

## 2020-02-12 DIAGNOSIS — Z74.09 IMPAIRED MOBILITY AND ADLS: ICD-10-CM

## 2020-02-12 DIAGNOSIS — Z78.9 IMPAIRED MOBILITY AND ADLS: ICD-10-CM

## 2020-02-12 LAB — GLUCOSE BLDC GLUCOMTR-MCNC: 85 MG/DL (ref 70–130)

## 2020-02-12 PROCEDURE — 27130 TOTAL HIP ARTHROPLASTY: CPT | Performed by: PHYSICIAN ASSISTANT

## 2020-02-12 PROCEDURE — 25010000002 PHENYLEPHRINE PER 1 ML: Performed by: NURSE ANESTHETIST, CERTIFIED REGISTERED

## 2020-02-12 PROCEDURE — 25010000002 CEFAZOLIN PER 500 MG: Performed by: ORTHOPAEDIC SURGERY

## 2020-02-12 PROCEDURE — 25010000003 CEFAZOLIN IN DEXTROSE 2-4 GM/100ML-% SOLUTION: Performed by: ORTHOPAEDIC SURGERY

## 2020-02-12 PROCEDURE — 82962 GLUCOSE BLOOD TEST: CPT

## 2020-02-12 PROCEDURE — 25010000002 MIDAZOLAM PER 1 MG: Performed by: NURSE ANESTHETIST, CERTIFIED REGISTERED

## 2020-02-12 PROCEDURE — C1776 JOINT DEVICE (IMPLANTABLE): HCPCS | Performed by: ORTHOPAEDIC SURGERY

## 2020-02-12 PROCEDURE — 25010000002 FENTANYL CITRATE (PF) 100 MCG/2ML SOLUTION: Performed by: NURSE ANESTHETIST, CERTIFIED REGISTERED

## 2020-02-12 PROCEDURE — 25010000002 PROPOFOL 10 MG/ML EMULSION: Performed by: NURSE ANESTHETIST, CERTIFIED REGISTERED

## 2020-02-12 PROCEDURE — 25010000002 HYDROMORPHONE PER 4 MG: Performed by: NURSE ANESTHETIST, CERTIFIED REGISTERED

## 2020-02-12 PROCEDURE — 25010000002 MORPHINE PER 10 MG: Performed by: ORTHOPAEDIC SURGERY

## 2020-02-12 PROCEDURE — 0SRB04A REPLACEMENT OF LEFT HIP JOINT WITH CERAMIC ON POLYETHYLENE SYNTHETIC SUBSTITUTE, UNCEMENTED, OPEN APPROACH: ICD-10-PCS | Performed by: ORTHOPAEDIC SURGERY

## 2020-02-12 PROCEDURE — 72170 X-RAY EXAM OF PELVIS: CPT

## 2020-02-12 PROCEDURE — 25010000002 ONDANSETRON PER 1 MG: Performed by: ORTHOPAEDIC SURGERY

## 2020-02-12 PROCEDURE — 25010000002 KETOROLAC TROMETHAMINE PER 15 MG: Performed by: ORTHOPAEDIC SURGERY

## 2020-02-12 PROCEDURE — 25010000002 ROPIVACAINE PER 1 MG: Performed by: ORTHOPAEDIC SURGERY

## 2020-02-12 PROCEDURE — 27130 TOTAL HIP ARTHROPLASTY: CPT | Performed by: ORTHOPAEDIC SURGERY

## 2020-02-12 PROCEDURE — 25010000002 HYDROMORPHONE PER 4 MG: Performed by: ORTHOPAEDIC SURGERY

## 2020-02-12 DEVICE — SCRW HEX LP TRIDENT2 6.5X40MM: Type: IMPLANTABLE DEVICE | Status: FUNCTIONAL

## 2020-02-12 DEVICE — HD FEM/HIP BIOLOX/DELTA V40 CERAM 36MM PLS2.5: Type: IMPLANTABLE DEVICE | Site: HIP | Status: FUNCTIONAL

## 2020-02-12 DEVICE — SHLL TRIDENT2 TRITANIUM C/HL 54MM: Type: IMPLANTABLE DEVICE | Status: FUNCTIONAL

## 2020-02-12 DEVICE — INSRT TRIDENT X3 0D 36MM SZE: Type: IMPLANTABLE DEVICE | Site: HIP | Status: FUNCTIONAL

## 2020-02-12 DEVICE — STEM FEM ACCOLADE2 V40 127D 37X114X50 SZ7: Type: IMPLANTABLE DEVICE | Site: HIP | Status: FUNCTIONAL

## 2020-02-12 DEVICE — TOTL HIP HI DEMAND STRYKER: Type: IMPLANTABLE DEVICE | Status: FUNCTIONAL

## 2020-02-12 DEVICE — WAX BONE HEMO NAT 2.5G: Type: IMPLANTABLE DEVICE | Status: FUNCTIONAL

## 2020-02-12 RX ORDER — ONDANSETRON 4 MG/1
4 TABLET, FILM COATED ORAL EVERY 6 HOURS PRN
Status: DISCONTINUED | OUTPATIENT
Start: 2020-02-12 | End: 2020-02-13 | Stop reason: HOSPADM

## 2020-02-12 RX ORDER — FAMOTIDINE 10 MG/ML
20 INJECTION, SOLUTION INTRAVENOUS ONCE
Status: CANCELLED | OUTPATIENT
Start: 2020-02-12 | End: 2020-02-12

## 2020-02-12 RX ORDER — ACETAMINOPHEN 500 MG
1000 TABLET ORAL ONCE
Status: COMPLETED | OUTPATIENT
Start: 2020-02-12 | End: 2020-02-12

## 2020-02-12 RX ORDER — OXYCODONE HYDROCHLORIDE 5 MG/1
5 TABLET ORAL EVERY 4 HOURS PRN
Status: DISCONTINUED | OUTPATIENT
Start: 2020-02-12 | End: 2020-02-13 | Stop reason: HOSPADM

## 2020-02-12 RX ORDER — LABETALOL HYDROCHLORIDE 5 MG/ML
5 INJECTION, SOLUTION INTRAVENOUS
Status: DISCONTINUED | OUTPATIENT
Start: 2020-02-12 | End: 2020-02-12 | Stop reason: HOSPADM

## 2020-02-12 RX ORDER — OXYCODONE HCL 10 MG/1
10 TABLET, FILM COATED, EXTENDED RELEASE ORAL ONCE
Status: COMPLETED | OUTPATIENT
Start: 2020-02-12 | End: 2020-02-12

## 2020-02-12 RX ORDER — MAGNESIUM HYDROXIDE 1200 MG/15ML
LIQUID ORAL AS NEEDED
Status: DISCONTINUED | OUTPATIENT
Start: 2020-02-12 | End: 2020-02-12 | Stop reason: HOSPADM

## 2020-02-12 RX ORDER — ONDANSETRON 2 MG/ML
4 INJECTION INTRAMUSCULAR; INTRAVENOUS EVERY 6 HOURS PRN
Status: DISCONTINUED | OUTPATIENT
Start: 2020-02-12 | End: 2020-02-13 | Stop reason: HOSPADM

## 2020-02-12 RX ORDER — MELOXICAM 7.5 MG/1
15 TABLET ORAL DAILY
Status: DISCONTINUED | OUTPATIENT
Start: 2020-02-13 | End: 2020-02-13 | Stop reason: HOSPADM

## 2020-02-12 RX ORDER — LABETALOL HYDROCHLORIDE 5 MG/ML
10 INJECTION, SOLUTION INTRAVENOUS EVERY 4 HOURS PRN
Status: DISCONTINUED | OUTPATIENT
Start: 2020-02-12 | End: 2020-02-13 | Stop reason: HOSPADM

## 2020-02-12 RX ORDER — SODIUM CHLORIDE 0.9 % (FLUSH) 0.9 %
10 SYRINGE (ML) INJECTION AS NEEDED
Status: DISCONTINUED | OUTPATIENT
Start: 2020-02-12 | End: 2020-02-12 | Stop reason: HOSPADM

## 2020-02-12 RX ORDER — FENTANYL CITRATE 50 UG/ML
50 INJECTION, SOLUTION INTRAMUSCULAR; INTRAVENOUS
Status: DISCONTINUED | OUTPATIENT
Start: 2020-02-12 | End: 2020-02-12 | Stop reason: HOSPADM

## 2020-02-12 RX ORDER — MIDAZOLAM HYDROCHLORIDE 1 MG/ML
INJECTION INTRAMUSCULAR; INTRAVENOUS AS NEEDED
Status: DISCONTINUED | OUTPATIENT
Start: 2020-02-12 | End: 2020-02-12 | Stop reason: SURG

## 2020-02-12 RX ORDER — LIDOCAINE HYDROCHLORIDE 10 MG/ML
0.5 INJECTION, SOLUTION EPIDURAL; INFILTRATION; INTRACAUDAL; PERINEURAL ONCE AS NEEDED
Status: COMPLETED | OUTPATIENT
Start: 2020-02-12 | End: 2020-02-12

## 2020-02-12 RX ORDER — MELOXICAM 15 MG/1
15 TABLET ORAL ONCE
Status: COMPLETED | OUTPATIENT
Start: 2020-02-12 | End: 2020-02-12

## 2020-02-12 RX ORDER — ONDANSETRON 2 MG/ML
4 INJECTION INTRAMUSCULAR; INTRAVENOUS ONCE AS NEEDED
Status: DISCONTINUED | OUTPATIENT
Start: 2020-02-12 | End: 2020-02-12 | Stop reason: HOSPADM

## 2020-02-12 RX ORDER — HYDROMORPHONE HYDROCHLORIDE 1 MG/ML
0.5 INJECTION, SOLUTION INTRAMUSCULAR; INTRAVENOUS; SUBCUTANEOUS
Status: DISCONTINUED | OUTPATIENT
Start: 2020-02-12 | End: 2020-02-12 | Stop reason: HOSPADM

## 2020-02-12 RX ORDER — BISACODYL 5 MG/1
10 TABLET, DELAYED RELEASE ORAL DAILY PRN
Status: DISCONTINUED | OUTPATIENT
Start: 2020-02-12 | End: 2020-02-13 | Stop reason: HOSPADM

## 2020-02-12 RX ORDER — NALOXONE HCL 0.4 MG/ML
0.4 VIAL (ML) INJECTION AS NEEDED
Status: DISCONTINUED | OUTPATIENT
Start: 2020-02-12 | End: 2020-02-12 | Stop reason: HOSPADM

## 2020-02-12 RX ORDER — FAMOTIDINE 20 MG/1
20 TABLET, FILM COATED ORAL ONCE
Status: COMPLETED | OUTPATIENT
Start: 2020-02-12 | End: 2020-02-12

## 2020-02-12 RX ORDER — NALOXONE HCL 0.4 MG/ML
0.1 VIAL (ML) INJECTION
Status: DISCONTINUED | OUTPATIENT
Start: 2020-02-12 | End: 2020-02-13 | Stop reason: HOSPADM

## 2020-02-12 RX ORDER — EPHEDRINE SULFATE 50 MG/ML
5 INJECTION, SOLUTION INTRAVENOUS ONCE AS NEEDED
Status: DISCONTINUED | OUTPATIENT
Start: 2020-02-12 | End: 2020-02-12 | Stop reason: HOSPADM

## 2020-02-12 RX ORDER — FENTANYL CITRATE 50 UG/ML
INJECTION, SOLUTION INTRAMUSCULAR; INTRAVENOUS AS NEEDED
Status: DISCONTINUED | OUTPATIENT
Start: 2020-02-12 | End: 2020-02-12 | Stop reason: SURG

## 2020-02-12 RX ORDER — SODIUM CHLORIDE 9 MG/ML
100 INJECTION, SOLUTION INTRAVENOUS CONTINUOUS
Status: DISCONTINUED | OUTPATIENT
Start: 2020-02-12 | End: 2020-02-13 | Stop reason: HOSPADM

## 2020-02-12 RX ORDER — HYDROMORPHONE HYDROCHLORIDE 1 MG/ML
0.5 INJECTION, SOLUTION INTRAMUSCULAR; INTRAVENOUS; SUBCUTANEOUS
Status: DISCONTINUED | OUTPATIENT
Start: 2020-02-12 | End: 2020-02-13 | Stop reason: HOSPADM

## 2020-02-12 RX ORDER — CEFAZOLIN SODIUM 2 G/100ML
2 INJECTION, SOLUTION INTRAVENOUS EVERY 8 HOURS
Status: COMPLETED | OUTPATIENT
Start: 2020-02-12 | End: 2020-02-13

## 2020-02-12 RX ORDER — CEFAZOLIN SODIUM 2 G/100ML
2 INJECTION, SOLUTION INTRAVENOUS ONCE
Status: COMPLETED | OUTPATIENT
Start: 2020-02-12 | End: 2020-02-12

## 2020-02-12 RX ORDER — ASPIRIN 325 MG
325 TABLET, DELAYED RELEASE (ENTERIC COATED) ORAL EVERY 12 HOURS SCHEDULED
Status: DISCONTINUED | OUTPATIENT
Start: 2020-02-13 | End: 2020-02-13 | Stop reason: HOSPADM

## 2020-02-12 RX ORDER — PRAVASTATIN SODIUM 40 MG
40 TABLET ORAL NIGHTLY
Status: DISCONTINUED | OUTPATIENT
Start: 2020-02-12 | End: 2020-02-13 | Stop reason: HOSPADM

## 2020-02-12 RX ORDER — SODIUM CHLORIDE, SODIUM LACTATE, POTASSIUM CHLORIDE, CALCIUM CHLORIDE 600; 310; 30; 20 MG/100ML; MG/100ML; MG/100ML; MG/100ML
9 INJECTION, SOLUTION INTRAVENOUS CONTINUOUS
Status: DISCONTINUED | OUTPATIENT
Start: 2020-02-12 | End: 2020-02-13 | Stop reason: HOSPADM

## 2020-02-12 RX ORDER — BUPIVACAINE HYDROCHLORIDE 5 MG/ML
INJECTION, SOLUTION PERINEURAL
Status: COMPLETED | OUTPATIENT
Start: 2020-02-12 | End: 2020-02-12

## 2020-02-12 RX ORDER — OXYCODONE HYDROCHLORIDE 5 MG/1
10 TABLET ORAL EVERY 4 HOURS PRN
Status: DISCONTINUED | OUTPATIENT
Start: 2020-02-12 | End: 2020-02-13 | Stop reason: HOSPADM

## 2020-02-12 RX ORDER — ACETAMINOPHEN 500 MG
1000 TABLET ORAL EVERY 6 HOURS SCHEDULED
Status: DISCONTINUED | OUTPATIENT
Start: 2020-02-12 | End: 2020-02-13 | Stop reason: HOSPADM

## 2020-02-12 RX ORDER — AMITRIPTYLINE HYDROCHLORIDE 25 MG/1
12.5 TABLET, FILM COATED ORAL NIGHTLY PRN
Status: DISCONTINUED | OUTPATIENT
Start: 2020-02-12 | End: 2020-02-13 | Stop reason: HOSPADM

## 2020-02-12 RX ORDER — SODIUM CHLORIDE, SODIUM LACTATE, POTASSIUM CHLORIDE, CALCIUM CHLORIDE 600; 310; 30; 20 MG/100ML; MG/100ML; MG/100ML; MG/100ML
100 INJECTION, SOLUTION INTRAVENOUS CONTINUOUS
Status: DISCONTINUED | OUTPATIENT
Start: 2020-02-12 | End: 2020-02-13 | Stop reason: HOSPADM

## 2020-02-12 RX ORDER — BISACODYL 10 MG
10 SUPPOSITORY, RECTAL RECTAL DAILY PRN
Status: DISCONTINUED | OUTPATIENT
Start: 2020-02-12 | End: 2020-02-13 | Stop reason: HOSPADM

## 2020-02-12 RX ORDER — DOCUSATE SODIUM 100 MG/1
100 CAPSULE, LIQUID FILLED ORAL 2 TIMES DAILY PRN
Status: DISCONTINUED | OUTPATIENT
Start: 2020-02-12 | End: 2020-02-13 | Stop reason: HOSPADM

## 2020-02-12 RX ORDER — MEPERIDINE HYDROCHLORIDE 25 MG/ML
12.5 INJECTION INTRAMUSCULAR; INTRAVENOUS; SUBCUTANEOUS
Status: DISCONTINUED | OUTPATIENT
Start: 2020-02-12 | End: 2020-02-12 | Stop reason: HOSPADM

## 2020-02-12 RX ORDER — SODIUM CHLORIDE 0.9 % (FLUSH) 0.9 %
10 SYRINGE (ML) INJECTION EVERY 12 HOURS SCHEDULED
Status: DISCONTINUED | OUTPATIENT
Start: 2020-02-12 | End: 2020-02-12 | Stop reason: HOSPADM

## 2020-02-12 RX ORDER — LIDOCAINE HYDROCHLORIDE 10 MG/ML
INJECTION, SOLUTION EPIDURAL; INFILTRATION; INTRACAUDAL; PERINEURAL AS NEEDED
Status: DISCONTINUED | OUTPATIENT
Start: 2020-02-12 | End: 2020-02-12 | Stop reason: SURG

## 2020-02-12 RX ADMIN — LIDOCAINE HYDROCHLORIDE 20 ML: 10 INJECTION, SOLUTION EPIDURAL; INFILTRATION; INTRACAUDAL; PERINEURAL at 14:09

## 2020-02-12 RX ADMIN — PRAVASTATIN SODIUM 40 MG: 40 TABLET ORAL at 20:15

## 2020-02-12 RX ADMIN — SODIUM CHLORIDE 100 ML/HR: 9 INJECTION, SOLUTION INTRAVENOUS at 18:32

## 2020-02-12 RX ADMIN — SODIUM CHLORIDE, POTASSIUM CHLORIDE, SODIUM LACTATE AND CALCIUM CHLORIDE: 600; 310; 30; 20 INJECTION, SOLUTION INTRAVENOUS at 15:55

## 2020-02-12 RX ADMIN — MIDAZOLAM 2 MG: 1 INJECTION INTRAMUSCULAR; INTRAVENOUS at 14:09

## 2020-02-12 RX ADMIN — ONDANSETRON 4 MG: 2 INJECTION INTRAMUSCULAR; INTRAVENOUS at 18:52

## 2020-02-12 RX ADMIN — MELOXICAM 15 MG: 15 TABLET ORAL at 10:54

## 2020-02-12 RX ADMIN — ACETAMINOPHEN 1000 MG: 500 TABLET ORAL at 10:54

## 2020-02-12 RX ADMIN — ACETAMINOPHEN 1000 MG: 500 TABLET, FILM COATED ORAL at 18:31

## 2020-02-12 RX ADMIN — FAMOTIDINE 20 MG: 20 TABLET ORAL at 10:54

## 2020-02-12 RX ADMIN — OXYCODONE HYDROCHLORIDE 5 MG: 5 TABLET ORAL at 20:15

## 2020-02-12 RX ADMIN — SODIUM CHLORIDE, POTASSIUM CHLORIDE, SODIUM LACTATE AND CALCIUM CHLORIDE 9 ML/HR: 600; 310; 30; 20 INJECTION, SOLUTION INTRAVENOUS at 10:54

## 2020-02-12 RX ADMIN — MUPIROCIN 1 APPLICATION: 20 OINTMENT TOPICAL at 10:54

## 2020-02-12 RX ADMIN — LIDOCAINE HYDROCHLORIDE 0.5 ML: 10 INJECTION, SOLUTION EPIDURAL; INFILTRATION; INTRACAUDAL; PERINEURAL at 10:57

## 2020-02-12 RX ADMIN — CEFAZOLIN 2 G: 10 INJECTION, POWDER, FOR SOLUTION INTRAVENOUS; PARENTERAL at 20:15

## 2020-02-12 RX ADMIN — PHENYLEPHRINE HYDROCHLORIDE 100 MCG: 10 INJECTION INTRAVENOUS at 14:52

## 2020-02-12 RX ADMIN — PHENYLEPHRINE HYDROCHLORIDE 100 MCG: 10 INJECTION INTRAVENOUS at 15:20

## 2020-02-12 RX ADMIN — FENTANYL CITRATE 100 MCG: 50 INJECTION, SOLUTION INTRAMUSCULAR; INTRAVENOUS at 14:09

## 2020-02-12 RX ADMIN — BUPIVACAINE HYDROCHLORIDE 1.8 ML: 5 INJECTION, SOLUTION PERINEURAL at 14:14

## 2020-02-12 RX ADMIN — HYDROMORPHONE HYDROCHLORIDE 0.5 MG: 1 INJECTION, SOLUTION INTRAMUSCULAR; INTRAVENOUS; SUBCUTANEOUS at 22:45

## 2020-02-12 RX ADMIN — HYDROMORPHONE HYDROCHLORIDE 0.5 MG: 1 INJECTION, SOLUTION INTRAMUSCULAR; INTRAVENOUS; SUBCUTANEOUS at 16:53

## 2020-02-12 RX ADMIN — PHENYLEPHRINE HYDROCHLORIDE 100 MCG: 10 INJECTION INTRAVENOUS at 15:00

## 2020-02-12 RX ADMIN — CEFAZOLIN SODIUM 2 G: 2 INJECTION, SOLUTION INTRAVENOUS at 14:05

## 2020-02-12 RX ADMIN — OXYCODONE HYDROCHLORIDE 10 MG: 10 TABLET, FILM COATED, EXTENDED RELEASE ORAL at 10:54

## 2020-02-12 RX ADMIN — TRANEXAMIC ACID 1000 MG: 100 INJECTION, SOLUTION INTRAVENOUS at 14:16

## 2020-02-12 RX ADMIN — TRANEXAMIC ACID 1000 MG: 100 INJECTION, SOLUTION INTRAVENOUS at 15:32

## 2020-02-12 RX ADMIN — PROPOFOL 100 MCG/KG/MIN: 10 INJECTION, EMULSION INTRAVENOUS at 14:17

## 2020-02-12 NOTE — ANESTHESIA POSTPROCEDURE EVALUATION
Patient: Kameron Khan    Procedure Summary     Date:  02/12/20 Room / Location:   [facility]    Anesthesia Start:  1406 Anesthesia Stop:      Procedure:  TOTAL HIP ARTHROPLASTY LEFT (Left Hip) Diagnosis:       Primary osteoarthritis of left hip      (Primary osteoarthritis of left hip [M16.12])    Surgeon:  Dom Terry MD Provider:  Eddi Patrick MD    Anesthesia Type:  spinal ASA Status:  2          Anesthesia Type: spinal    Vitals  Vitals Value Taken Time   BP     Temp     Pulse     Resp     SpO2 91 % 2/12/2020  4:25 PM   Vitals shown include unvalidated device data.        Post Anesthesia Care and Evaluation    Patient location during evaluation: PACU  Patient participation: complete - patient participated  Level of consciousness: awake and alert  Pain score: 0  Pain management: adequate  Airway patency: patent  Anesthetic complications: No anesthetic complications  PONV Status: none  Cardiovascular status: hemodynamically stable and acceptable  Respiratory status: nonlabored ventilation, acceptable and nasal cannula  Hydration status: acceptable

## 2020-02-12 NOTE — DISCHARGE INSTRUCTIONS
"DISCHARGE INSTRUCTIONS   Dr. Terry     Total Hip Replacement/Hip Hemiarthroplasty     Wound Care   1) Keep wound / incision area clean and dry.   2) Dressing to remain in place until post-operative day 7. Upon dressing removal, assess for wound drainage. If no drainage is present, keep wound / incision area open to air as much as possible. If drainage is present, place sterile dressing to cover wound and assess daily. If drainage continues to occur after post-operative day 14, call the office for an urgent appointment. (You should be seen in the clinic within 1-2 days of calling). DO NOT REMOVE SUTURES (IF PRESENT) UNDER ANY CIRCUMSTANCES PRIOR TO FOLLOW UP APPOINTMENT.  3) No baths or swimming until otherwise instructed. The wound must remain dry for 10 days after surgery. After 10 days, you may begin to shower only if no drainage is present. No submerging the wound under standing water until cleared by your physician (no baths, hot tubs, swimming pools, etc). Sponge baths are the best way to perform personal hygiene while at the same time protecting the wound from moisture.   4) Prior to showering, the wound must remain dry for 72 consecutive hours (no drainage whatsoever) prior to showering. If the wound drains or spots, the clock \"resets\" - make sure the wound has been drainage-free for 72 consecutive hours.   5) Once you are allowed to get the wound wet, please use gentle soap to wash the wound area. DO NOT aggressively scrub the wound with a washcloth or bath sponge. Please visually inspect your wound(s) at least once daily. If the wound(s) are in a difficult to see location, please use a mirror or have someone else assist with visual inspection.   6) No scrubbing the wound. You may \"pad dry\" the wound, but do not rub, as this may open up the wound and pre-dispose to wound infection.   7) Do not apply lotions or creams to incision site, unless instructed otherwise.   8) Observe for redness, swelling, or " drainage. Please call the clinic immediately if you have fevers, chills with warmth/redness surrounding wound site or if you notice pus drainage from the wound site     Activity   No heavy lifting objects greater than 10 pounds.   No driving while on narcotic pain medication.   No submerging wound under standing water (pool, bath tub, etc.) until otherwise instructed.   You may be protected weightbearing as tolerated on your operative (left lower) extremity   Use a walker for ambulation for at least 2 weeks after surgery.  May wean from walker after 2 weeks if approved by your therapist.   Posterior hip precautions for 6 weeks: No bending the hip past 90 degrees. Do not allow the leg to cross the midline of your body (adduction). No twisting motions. Ask your physical therapist to review these precautions with you.     Blood Clot Prophylaxis   (Aspirin vs. Lovenox vs. Eliquis administration is determined by your surgeon and tailored to your specific risk profile. You will be discharged with one of these medications.) You will need to complete a total 4 week course of enteric coated aspirin 325 mg (or 81mg) twice daily or Eliquis 2.5mg twice daily, in order to minimize your risk of blood clots following surgery. You will be supplied with a prescription to obtain this. Alternatively, you will need to compete a total 2 week course of Lovenox after surgery (followed by a 2 week course of aspirin twice daily), in order to minimize the risk of blood clots following surgery. Lovenox requires a single shot in the abdomen, to be taken once daily. You will be supplied with the prescription to obtain this. Prior to your discharge from the hospital, the nursing staff will instruct you on self-administration of the Lovenox, if you will be returning directly home from the hospital.     Discharge Pain Medications   You will be given a prescription for pain medication. You should start taking this the same day after your surgery.  "Wean off as tolerated. Do not wait to take the pain medication until the pain is severe, as it will be difficult to \"catch up\" once this occurs. The pain medication usually reaches its full effect ~1 hour after ingesting. If you have been sent home on Valium, this medication works well for muscle spasms. If you have been sent home on Colace, this medication should be taken until you are off all narcotic (i.e. Norco, Percocet, Oxycodone, etc) pain medications, in order to prevent constipation. Percocet or Norco have Tylenol in their ingredient lists. You must be careful not to exceed 4,000mg (4 grams) of Tylenol, from all sources, within a single 24-hr period. This means that you may not take more than 12 Percocets or Norcos within a 24-hr period. Do NOT take Regular or Extra Strength Tylenol when taking your Percocet or Norco medications.  If you have been sent home with a combination of oxycodone and Tylenol, please take Tylenol as scheduled.  The oxycodone is to be taken as needed for \"breakthrough\" pain.  Some common side effects of the narcotic pain medications (Percocet, Oxycodone, Norco, etc) include nausea and itching. Benadryl is a great over the counter medication that helps calm your stomach, decreases your anxiety levels, and minimizes the itching. You can easily purchase this at your local pharmacy as an over-the-counter medication. Please abide by the instructions as printed on the bottle. If your nausea persists, make sure to take small amounts of crackers or other lighter foods.     Follow-Up   Follow-up with Dr. Terry's office in 3 weeks from the surgery date for a post-operative evaluation. Have the following xrays done upon arrival to the follow-up appointment: AP pelvis. Please call Dr. Terry's office at [phone] for orthopaedic appointments or questions.  "

## 2020-02-12 NOTE — H&P
Patient Name: Kameron Khan  MRN: [id number]  : 1965  DOS: 2020    Attending: Dom Terry MD    Primary Care Provider: KARRIE Perez MD      Chief complaint: Left hip pain    Subjective   Patient is a 54 y.o. male presented for scheduled surgery by Dr. Terry.  He anticipates a left total hip replacement today.  His hip has been severely painful for the last 6 months.  He denies the use of assistive device for ambulation or recent falls.    Seen preop he is doing well.  He denies pain or other complaints.  He denies nausea, shortness of breath or chest pain.  No history of DVT or PE.       Allergies:  No Known Allergies    Meds:  Medications Prior to Admission   Medication Sig Dispense Refill Last Dose   • acetaminophen (TYLENOL) 500 MG tablet Take 1,000 mg by mouth As Needed for Mild Pain .   2020 at 2200   • amitriptyline (ELAVIL) 10 MG tablet Take 12.5 mg by mouth At Night As Needed for Sleep.   2020   • Chlorhexidine Gluconate 4 % solution Shower with hibiclens solution as directed for 5 days prior to surgery 237 mL 0 2020 at Unknown time   • finasteride (PROSCAR) 5 MG tablet Take  by mouth Daily. 1/3 of tablet   2020 at Unknown time   • Loratadine 10 MG capsule Take 10 mg by mouth Daily As Needed (allergies).   2020 at Unknown time   • Misc Natural Products (OSTEO BI-FLEX JOINT SHIELD) tablet Take 2 tablets by mouth Daily.   2020 at Unknown time   • Multiple Vitamins-Minerals (MULTIVITAMIN ADULT PO) Take 1 tablet by mouth Daily.   2020 at Unknown time   • mupirocin (BACTROBAN) 2 % ointment Apply pea size amount to each nostril twice daily for 5 days prior to surgery 22 g 0 2020 at 2200   • naproxen (NAPROSYN) 500 MG tablet Take 500 mg by mouth 2 (Two) Times a Day With Meals.   2020   • pravastatin (PRAVACHOL) 40 MG tablet Take 40 mg by mouth Daily.   2020   • psyllium (METAMUCIL) 58.6 % packet Take 1 packet by mouth Daily.    "2/5/2020       History:   Past Medical History:   Diagnosis Date   • Alcohol use 5/24/2017   • Allergic    • Arthritis 5/24/2017   • Diverticulitis of sigmoid colon 05/24/2017   • Elevated liver enzymes 5/24/2017   • History of transfusion     s/p hiatal hernia surgery- no reaction to blood   • Hyperglycemia 5/24/2017   • Hyperlipidemia    • IGT (impaired glucose tolerance) 5/24/2017   • Leukocytosis 05/24/2017   • Wears glasses     Readers      Past Surgical History:   Procedure Laterality Date   • ABDOMINAL SURGERY      pt reports hx years ago a surgery for hiatal hernia with esophageal reconstruction, \"I can't throw up\"   • COLONOSCOPY  2018   • ENDOSCOPY     • FOOT SURGERY Right     Chilectomy and osteotomy- [facility]   • HERNIA REPAIR     • KNEE SURGERY Left     Medial meniscus sx     Family History   Problem Relation Age of Onset   • Heart failure Mother    • Diabetes Mother    • Stroke Mother    • Cancer Mother    • Osteoarthritis Mother    • Hypertension Father    • Heart failure Father      Social History     Tobacco Use   • Smoking status: Light Tobacco Smoker     Types: Cigars   • Smokeless tobacco: Never Used   • Tobacco comment: cigar rarely; never smoked cigarettes    Substance Use Topics   • Alcohol use: Yes     Comment: 1 -2 drinks daily on average   • Drug use: No   He lives alone and has 1 child.  He is a contractor.    Review of Systems  Pertinent items are noted in HPI, all other systems reviewed and negative    Vital Signs  Visit Vitals  /90 (BP Location: Right arm, Patient Position: Lying)   Pulse 86   Temp 98.4 °F (36.9 °C) (Temporal)   Resp 16   Ht 180.3 cm (71\")   Wt 91.6 kg (201 lb 15.1 oz)   SpO2 97%   BMI 28.17 kg/m²       Physical Exam:    General Appearance:    Alert, cooperative, in no acute distress   Head:    Normocephalic, without obvious abnormality, atraumatic   Eyes:            Lids and lashes normal, conjunctivae and sclerae normal, no   icterus, no pallor, corneas clear "   Ears:    Ears appear intact with no abnormalities noted   Neck:   No adenopathy, supple, trachea midline, no thyromegaly   Lungs:     Clear to auscultation, respirations regular, even and                   unlabored    Heart:    Regular rhythm and normal rate, normal S1 and S2, no            murmur, no gallop   Abdomen:     Normal bowel sounds, no masses, no organomegaly, soft        non-tender, non-distended, no guarding, no rebound                 tenderness   Genitalia:    Deferred   Extremities:   no edema, no cyanosis, no              redness   Pulses:   Pulses palpable and equal bilaterally   Skin:   No bleeding, bruising or rash   Neurologic:   Cranial nerves 2 - 12 grossly intact, sensation intact. Flexion and dorsiflexion intact bilateral feet.      I reviewed the patient's new clinical results.     Results for AMIRA FOSTER (MRN [id number]) as of 2/12/2020 14:20   Ref. Range 1/30/2020 14:44   Glucose Latest Ref Range: 65 - 99 mg/dL 88   Sodium Latest Ref Range: 136 - 145 mmol/L 141   Potassium Latest Ref Range: 3.5 - 5.2 mmol/L 4.4   CO2 Latest Ref Range: 22.0 - 29.0 mmol/L 30.0 (H)   Chloride Latest Ref Range: 98 - 107 mmol/L 100   Anion Gap Latest Ref Range: 5.0 - 15.0 mmol/L 11.0   Creatinine Latest Ref Range: 0.76 - 1.27 mg/dL 0.72 (L)   BUN Latest Ref Range: 6 - 20 mg/dL 19   BUN/Creatinine Ratio Latest Ref Range: 7.0 - 25.0  26.4 (H)   Calcium Latest Ref Range: 8.6 - 10.5 mg/dL 9.5   eGFR Non African Am Latest Ref Range: >60 mL/min/1.73 114   Hemoglobin A1C Latest Ref Range: 4.80 - 5.60 % 5.40   Protime Latest Ref Range: 11.2 - 14.3 Seconds 12.5   INR Latest Ref Range: 0.85 - 1.16  0.98   PTT Latest Ref Range: 24.0 - 37.0 seconds 28.4   WBC Latest Ref Range: 3.40 - 10.80 10*3/mm3 7.15   RBC Latest Ref Range: 4.14 - 5.80 10*6/mm3 5.15   Hemoglobin Latest Ref Range: 13.0 - 17.7 g/dL 15.6   Hematocrit Latest Ref Range: 37.5 - 51.0 % 46.8   RDW Latest Ref Range: 12.3 - 15.4 % 12.3   MCV  Latest Ref Range: 79.0 - 97.0 fL 90.9   MCH Latest Ref Range: 26.6 - 33.0 pg 30.3   MCHC Latest Ref Range: 31.5 - 35.7 g/dL 33.3   MPV Latest Ref Range: 6.0 - 12.0 fL 10.1   Platelets Latest Ref Range: 140 - 450 10*3/mm3 300     Assessment and Plan:     Primary osteoarthritis of left hip    Hyperlipidemia      Plan  1. PT/OT- early ambulation postop  2. Pain control-prns   3. IS-encourage  4. DVT proph- mechs/ASA  5. Bowel regimen  6. Resume home medications as appropriate  7. Monitor post-op labs  8. Discharge planning for home    Hyperlipidemia  -Continue home statin      Gerri Moore, APRN  02/12/20  2:19 PM     Above is noted, agree.  Patient underwent left total hip arthroplasty under spinal anesthesia with local periarticular block.  Procedure was done by Dr. Terry, was tolerated well, patient admitted for further management.  Seen in his room at surgery, doing well with good pain control, no nausea, vomiting, or shortness of breath.  Spine anesthesia effects are starting to subside.  Vital signs with a temperature of 98.4 heart rate 86 respiratory rate 16 and blood pressure 136/90  Awake, alert, oriented, not in acute distress.  Lungs: Clear to station bilaterally, no wheeze or rales.  Heart: Regular, S1-S2, no gallops  Abdomen: Soft, nontender nondistended, no hepatomegaly.  Extremities: Left hip with clean dry and intact Aquacel dressing.  Distal lower extremities with intact pulses and cap refill.  Decreased sensation in the lower extremities under spinal fusion.  Patient started to have movement in his feet and is able to flex and dorsiflex both feet.    A.S/p left ANDRA.  I reviewed with patient postoperative management plan.  He expressed understanding and agreement.wy.

## 2020-02-12 NOTE — ANESTHESIA PROCEDURE NOTES
Spinal Block      Patient reassessed immediately prior to procedure    Patient location during procedure: OR  Indication:at surgeon's request  Performed By  CRNA: Gomez Bansal CRNA  Preanesthetic Checklist  Completed: patient identified, site marked, surgical consent, pre-op evaluation, timeout performed, IV checked, risks and benefits discussed and monitors and equipment checked  Spinal Block Prep:  Patient Position:sitting  Sterile Tech:cap, gloves, sterile barriers and mask  Prep:Chloraprep  Patient Monitoring:blood pressure monitoring, continuous pulse oximetry and EKG  Spinal Block Procedure  Approach:midline  Guidance:landmark technique and palpation technique  Location:L4-L5  Needle Type:Sprotte  Needle Gauge:25 G  Placement of Spinal needle event:cerebrospinal fluid aspirated  Paresthesia: no  Fluid Appearance:clear  Medications: bupivacaine (MARCAINE) 0.5 % injection, 1.8 mL  Med Administered at 2/12/2020 2:14 PM   Post Assessment  Patient Tolerance:patient tolerated the procedure well with no apparent complications  Complications no  Additional Notes  Procedure:  Pt assisted to sitting position, with legs in position of comfort over side of bed.  Pt. instructed in optimal spine presentation, the spine was prepped/ Draped and the skin at insertion site was anesthetized with 1% Lidocaine 2 ml.  The spinal needle was then advanced until CSF flow was obtained and LA was injected:

## 2020-02-12 NOTE — ANESTHESIA PREPROCEDURE EVALUATION
Anesthesia Evaluation     Patient summary reviewed and Nursing notes reviewed   no history of anesthetic complications:  NPO Solid Status: > 8 hours  NPO Liquid Status: > 8 hours           Airway   Mallampati: II  TM distance: >3 FB  Neck ROM: full  No difficulty expected  Dental      Pulmonary - negative pulmonary ROS and normal exam   Cardiovascular - normal exam    (+) hyperlipidemia,       Neuro/Psych- negative ROS  GI/Hepatic/Renal/Endo      Musculoskeletal     Abdominal    Substance History      OB/GYN          Other   arthritis,                      Anesthesia Plan    ASA 2     spinal     intravenous induction     Anesthetic plan, all risks, benefits, and alternatives have been provided, discussed and informed consent has been obtained with: patient.    Plan discussed with CRNA.

## 2020-02-12 NOTE — H&P
Pre-Op H&P  Kameron Khan  [id number]  1965    Chief complaint: Left hip pain    HPI:    Patient is a 54 y.o.male who presents with left hip pain for 20 years. Onset onset has been atraumatic and gradual in nature. Pain is localized to groin, lateral trochanter and gluteal region and is a 7/10 on the pain scale. Pain is described as dull, aching and stabbing. Associated symptoms include pain, stiffness and giving way/buckling. The pain is worse with walking, standing, sitting, climbing stairs and sleeping; resting improves the pain. Previous treatments have included: NSAIDS and physical therapy since symptom onset. Although some transient relief was reported with these interventions, these conservative measures have failed and symptoms have persisted. The patient is limited in daily activities and has had a significant decrease in quality of life as a result. Surgical intervention is recommended and he is agreeable. He is here today for a left total hip arthroplasty.     Review of Systems:  General ROS: negative for chills, fever or skin lesions;  No changes since last office visit  Cardiovascular ROS: no chest pain or dyspnea on exertion; +dyslipidemia  Respiratory ROS: no cough, shortness of breath, or wheezing; +cigar smoker    Allergies: No Known Allergies    Home Meds:    No current facility-administered medications on file prior to encounter.      Current Outpatient Medications on File Prior to Encounter   Medication Sig Dispense Refill   • acetaminophen (TYLENOL) 500 MG tablet Take 500 mg by mouth As Needed for Mild Pain .     • amitriptyline (ELAVIL) 10 MG tablet Take 12.5 mg by mouth At Night As Needed for Sleep.     • finasteride (PROSCAR) 5 MG tablet Take  by mouth Daily. 1/3 of tablet     • Multiple Vitamins-Minerals (MULTIVITAMIN ADULT PO) Take 1 tablet by mouth Daily.     • naproxen (NAPROSYN) 500 MG tablet Take 500 mg by mouth 2 (Two) Times a Day With Meals.     • pravastatin  "(PRAVACHOL) 40 MG tablet Take 40 mg by mouth Daily.         PMH:   Past Medical History:   Diagnosis Date   • Alcohol use 5/24/2017   • Allergic    • Arthritis 5/24/2017   • Diverticulitis of sigmoid colon 05/24/2017   • Elevated liver enzymes 5/24/2017   • History of transfusion     s/p hiatal hernia surgery- no reaction to blood   • Hyperglycemia 5/24/2017   • Hyperlipidemia    • IGT (impaired glucose tolerance) 5/24/2017   • Leukocytosis 05/24/2017   • Wears glasses     Readers      PSH:    Past Surgical History:   Procedure Laterality Date   • ABDOMINAL SURGERY      pt reports hx years ago a surgery for hiatal hernia with esophageal reconstruction, \"I can't throw up\"   • COLONOSCOPY  2018   • ENDOSCOPY     • FOOT SURGERY Right     Chilectomy and osteotomy- [facility]   • HERNIA REPAIR     • KNEE SURGERY Left     Medial meniscus sx         Social History:   Tobacco:   Social History     Tobacco Use   Smoking Status Light Tobacco Smoker   • Types: Cigars   Smokeless Tobacco Never Used   Tobacco Comment    cigar rarely; never smoked cigarettes       Alcohol:     Social History     Substance and Sexual Activity   Alcohol Use Yes    Comment: 1 -2 drinks daily on average       Vitals:           /90 (BP Location: Right arm, Patient Position: Lying)   Pulse 86   Temp 98.4 °F (36.9 °C) (Temporal)   Resp 16   Ht 180.3 cm (71\")   Wt 91.6 kg (201 lb 15.1 oz)   SpO2 97%   BMI 28.17 kg/m²     Physical Exam:  General Appearance:    Alert, cooperative, no distress, appears stated age   Head:    Normocephalic, without obvious abnormality, atraumatic   Lungs:     Clear to auscultation bilaterally, respirations unlabored    Heart:   Regular rate and rhythm, S1 and S2 normal, no murmur, rub    or gallop    Abdomen:    Soft, non-tender, +bowel sounds   Breast Exam:    deferred   Genitalia:    deferred   Extremities:   Extremities normal, atraumatic, no cyanosis or edema   Skin:   Skin color, texture, turgor normal, no " rashes or lesions   Neurologic:   Grossly intact   Results Review  LABS:  Lab Results   Component Value Date    WBC 7.15 01/30/2020    HGB 15.6 01/30/2020    HCT 46.8 01/30/2020    MCV 90.9 01/30/2020     01/30/2020    NEUTROABS 4.64 01/30/2020    GLUCOSE 88 01/30/2020    BUN 19 01/30/2020    CREATININE 0.72 (L) 01/30/2020    EGFRIFNONA 114 01/30/2020     01/30/2020    K 4.4 01/30/2020     01/30/2020    CO2 30.0 (H) 01/30/2020    MG 1.9 05/24/2017    CALCIUM 9.5 01/30/2020    ALBUMIN 3.80 05/24/2017    AST 28 05/24/2017    ALT 35 05/24/2017    BILITOT 0.7 05/24/2017       RADIOLOGY:  Imaging Results (Last 72 Hours)     ** No results found for the last 72 hours. **          I reviewed the patient's new clinical results.     1/30/20 ECG: reviewed, NSR    Cancer Staging (if applicable)  Cancer Patient: __ yes _X_no __unknown; If yes, clinical stage T:__ N:__M:__, stage group or __N/A    Impression: Primary osteoarthritis of left hip    Plan: Left total hip arthroplasty      Kira Be, APRN   2/12/2020   10:36 AM

## 2020-02-12 NOTE — BRIEF OP NOTE
TOTAL HIP ARTHROPLASTY  Progress Note    Kameron Khan  2/12/2020    Pre-op Diagnosis:   Primary osteoarthritis of left hip [M16.12]       Post-Op Diagnosis Codes:     * Primary osteoarthritis of left hip [M16.12]    Procedure/CPT® Codes:  NC TOTAL HIP ARTHROPLASTY [49550]    Procedure(s):  TOTAL HIP ARTHROPLASTY LEFT    Surgeon(s):  Dom Terry MD    Anesthesia: Spinal    Staff:   Circulator: Benita Munoz RN  Scrub Person: Nancy De Anda; Feroz Meyers  Physician assistant: Sunita Barone PA-C  Nursing Assistant: Dinora Sanders    Estimated Blood Loss: 250 mL    Urine Voided: * No values recorded between 2/12/2020  2:04 PM and 2/12/2020  4:07 PM *    Specimens:                None          Drains: * No LDAs found *    Findings: End-stage osteoarthritis left hip    Complications: None apparent      Dom Terry MD     Date: 2/12/2020  Time: 4:07 PM

## 2020-02-13 VITALS
SYSTOLIC BLOOD PRESSURE: 110 MMHG | RESPIRATION RATE: 16 BRPM | OXYGEN SATURATION: 94 % | HEIGHT: 71 IN | TEMPERATURE: 98.5 F | WEIGHT: 201.94 LBS | BODY MASS INDEX: 28.27 KG/M2 | DIASTOLIC BLOOD PRESSURE: 68 MMHG | HEART RATE: 97 BPM

## 2020-02-13 PROBLEM — Z96.642 STATUS POST TOTAL REPLACEMENT OF LEFT HIP: Status: ACTIVE | Noted: 2020-02-13

## 2020-02-13 LAB
ANION GAP SERPL CALCULATED.3IONS-SCNC: 11 MMOL/L (ref 5–15)
BUN BLD-MCNC: 16 MG/DL (ref 6–20)
BUN/CREAT SERPL: 24.2 (ref 7–25)
CALCIUM SPEC-SCNC: 8.5 MG/DL (ref 8.6–10.5)
CHLORIDE SERPL-SCNC: 101 MMOL/L (ref 98–107)
CO2 SERPL-SCNC: 25 MMOL/L (ref 22–29)
CREAT BLD-MCNC: 0.66 MG/DL (ref 0.76–1.27)
DEPRECATED RDW RBC AUTO: 40.8 FL (ref 37–54)
ERYTHROCYTE [DISTWIDTH] IN BLOOD BY AUTOMATED COUNT: 12.4 % (ref 12.3–15.4)
GFR SERPL CREATININE-BSD FRML MDRD: 126 ML/MIN/1.73
GLUCOSE BLD-MCNC: 119 MG/DL (ref 65–99)
HCT VFR BLD AUTO: 38.7 % (ref 37.5–51)
HGB BLD-MCNC: 13 G/DL (ref 13–17.7)
MCH RBC QN AUTO: 30.4 PG (ref 26.6–33)
MCHC RBC AUTO-ENTMCNC: 33.6 G/DL (ref 31.5–35.7)
MCV RBC AUTO: 90.6 FL (ref 79–97)
PLATELET # BLD AUTO: 256 10*3/MM3 (ref 140–450)
PMV BLD AUTO: 10.5 FL (ref 6–12)
POTASSIUM BLD-SCNC: 4.5 MMOL/L (ref 3.5–5.2)
RBC # BLD AUTO: 4.27 10*6/MM3 (ref 4.14–5.8)
SODIUM BLD-SCNC: 137 MMOL/L (ref 136–145)
WBC NRBC COR # BLD: 11.28 10*3/MM3 (ref 3.4–10.8)

## 2020-02-13 PROCEDURE — 97116 GAIT TRAINING THERAPY: CPT | Performed by: PHYSICAL THERAPIST

## 2020-02-13 PROCEDURE — 99024 POSTOP FOLLOW-UP VISIT: CPT | Performed by: ORTHOPAEDIC SURGERY

## 2020-02-13 PROCEDURE — 97110 THERAPEUTIC EXERCISES: CPT | Performed by: PHYSICAL THERAPIST

## 2020-02-13 PROCEDURE — 97535 SELF CARE MNGMENT TRAINING: CPT | Performed by: OCCUPATIONAL THERAPIST

## 2020-02-13 PROCEDURE — 80048 BASIC METABOLIC PNL TOTAL CA: CPT | Performed by: ORTHOPAEDIC SURGERY

## 2020-02-13 PROCEDURE — 97166 OT EVAL MOD COMPLEX 45 MIN: CPT | Performed by: OCCUPATIONAL THERAPIST

## 2020-02-13 PROCEDURE — 97162 PT EVAL MOD COMPLEX 30 MIN: CPT | Performed by: PHYSICAL THERAPIST

## 2020-02-13 PROCEDURE — 85027 COMPLETE CBC AUTOMATED: CPT | Performed by: NURSE PRACTITIONER

## 2020-02-13 PROCEDURE — 25010000002 CEFAZOLIN PER 500 MG: Performed by: ORTHOPAEDIC SURGERY

## 2020-02-13 RX ORDER — OXYCODONE HYDROCHLORIDE 5 MG/1
5 TABLET ORAL EVERY 4 HOURS PRN
Qty: 40 TABLET | Refills: 0 | Status: SHIPPED | OUTPATIENT
Start: 2020-02-13 | End: 2020-02-22

## 2020-02-13 RX ORDER — PSEUDOEPHEDRINE HCL 30 MG
100 TABLET ORAL 2 TIMES DAILY PRN
Start: 2020-02-13 | End: 2023-02-07

## 2020-02-13 RX ADMIN — PSYLLIUM HUSK 1 PACKET: 3.4 POWDER ORAL at 09:49

## 2020-02-13 RX ADMIN — OXYCODONE HYDROCHLORIDE 10 MG: 5 TABLET ORAL at 11:38

## 2020-02-13 RX ADMIN — ACETAMINOPHEN 1000 MG: 500 TABLET, FILM COATED ORAL at 11:41

## 2020-02-13 RX ADMIN — CEFAZOLIN 2 G: 10 INJECTION, POWDER, FOR SOLUTION INTRAVENOUS; PARENTERAL at 06:28

## 2020-02-13 RX ADMIN — MELOXICAM 15 MG: 7.5 TABLET ORAL at 09:49

## 2020-02-13 RX ADMIN — ASPIRIN 325 MG: 325 TABLET, COATED ORAL at 09:49

## 2020-02-13 RX ADMIN — ACETAMINOPHEN 1000 MG: 500 TABLET, FILM COATED ORAL at 00:15

## 2020-02-13 RX ADMIN — OXYCODONE HYDROCHLORIDE 10 MG: 5 TABLET ORAL at 06:28

## 2020-02-13 RX ADMIN — ACETAMINOPHEN 1000 MG: 500 TABLET, FILM COATED ORAL at 06:28

## 2020-02-13 RX ADMIN — OXYCODONE HYDROCHLORIDE 10 MG: 5 TABLET ORAL at 00:14

## 2020-02-13 NOTE — PROGRESS NOTES
Case Management Discharge Note      Final Note: Plan is home with [facility]. Family will transport. [employer] will deliver a rolling walker to the room.    Provided post acute provider list?: Yes  Post Acute Provider List: Home Health  Post Acute Provider Quality & Resource List: Yes  Delivered To: Patient  Method of Delivery: In person    Destination      No service has been selected for the patient.      Durable Medical Equipment      No service has been selected for the patient.      Dialysis/Infusion      No service has been selected for the patient.      Home Medical Care - Selection Complete      Service Provider Request Status Selected Services Address Phone Number Fax Number    [facility] Home Health Services [address] [place] [address] [phone]      Therapy      No service has been selected for the patient.      Community Resources      No service has been selected for the patient.             Final Discharge Disposition Code: 06 - home with home health care

## 2020-02-13 NOTE — THERAPY DISCHARGE NOTE
"Acute Care - Occupational Therapy Initial Eval/Discharge   [place]     Patient Name: Kameron Khan  : 1965  MRN: [id number]  Today's Date: 2020               Admit Date: 2020       ICD-10-CM ICD-9-CM   1. Status post total replacement of left hip Z96.642 V43.64   2. Primary osteoarthritis of left hip M16.12 715.15   3. Impaired mobility and ADLs Z74.09 799.89     Patient Active Problem List   Diagnosis   • Diverticulitis of sigmoid colon   • Leukocytosis   • Hyperlipidemia   • Elevated liver enzymes   • Tobacco use   • Arthritis   • Hyperglycemia   • Alcohol use   • Primary osteoarthritis of left hip   • Status post total replacement of left hip     Past Medical History:   Diagnosis Date   • Alcohol use 2017   • Allergic    • Arthritis 2017   • Diverticulitis of sigmoid colon 2017   • Elevated liver enzymes 2017   • History of transfusion     s/p hiatal hernia surgery- no reaction to blood   • Hyperglycemia 2017   • Hyperlipidemia    • IGT (impaired glucose tolerance) 2017   • Leukocytosis 2017   • Wears glasses     Readers      Past Surgical History:   Procedure Laterality Date   • ABDOMINAL SURGERY      pt reports hx years ago a surgery for hiatal hernia with esophageal reconstruction, \"I can't throw up\"   • COLONOSCOPY     • ENDOSCOPY     • FOOT SURGERY Right     Chilectomy and osteotomy- [place]   • HERNIA REPAIR     • KNEE SURGERY Left     Medial meniscus sx   • TOTAL HIP ARTHROPLASTY Left 2020    Procedure: TOTAL HIP ARTHROPLASTY LEFT;  Surgeon: Dom Terry MD;  Location: [facility];  Service: Orthopedics;  Laterality: Left;          OT ASSESSMENT FLOWSHEET (last 12 hours)      Occupational Therapy Evaluation     Row Name 20 1050                   OT Evaluation Time/Intention    Subjective Information  no complaints  -AR        Document Type  evaluation;therapy note (daily note);discharge treatment  -AR        Mode of " Treatment  occupational therapy  -AR           General Information    Patient Profile Reviewed?  yes  -AR        Prior Level of Function  min assist:;all household mobility;community mobility;gait;transfer;ADL's;independent:;driving  -AR        Existing Precautions/Restrictions  fall;hip, posterior  -AR           Relationship/Environment    Primary Source of Support/Comfort  friend;extended family  -AR        Lives With  child(kane), dependent  -AR        Concerns About Impact on Relationships  Pt shares custody with his 8 yo son, he will be staying with his mother for first few weeks. Pt's niece will be staying with him first few nights.   -AR           Resource/Environmental Concerns    Current Living Arrangements  home/apartment/condo  -AR        Resource/Environmental Concerns  none  -AR        Transportation Concerns  car, none  -AR           Cognitive Assessment/Interventions    Additional Documentation  Cognitive Assessment/Intervention (Group)  -AR           Cognitive Assessment/Intervention- PT/OT    Affect/Mental Status (Cognitive)  WNL  -AR        Orientation Status (Cognition)  oriented x 4  -AR        Follows Commands (Cognition)  WNL  -AR        Cognitive Function (Cognitive)  WNL  -AR           Safety Issues, Functional Mobility    Safety Issues Affecting Function (Mobility)  safety precaution awareness;safety precautions follow-through/compliance  -AR        Impairments Affecting Function (Mobility)  pain  -AR           Mobility Assessment/Treatment    Extremity Weight-bearing Status  left lower extremity  -AR        Left Lower Extremity (Weight-bearing Status)  weight-bearing as tolerated (WBAT)  -AR           Bed Mobility Assessment/Treatment    Bed Mobility Assessment/Treatment  supine-sit;sit-supine;scooting/bridging  -AR        Scooting/Bridging [place] (Bed Mobility)  supervision  -AR        Supine-Sit [place] (Bed Mobility)  supervision;verbal cues  -AR        Sit-Supine [place]  (Bed Mobility)  supervision;verbal cues  -AR        Bed Mobility, Safety Issues  decreased use of legs for bridging/pushing  -AR        Assistive Device (Bed Mobility)  leg   -AR        Comment (Bed Mobility)  Issued leg  and educated pt on use  -AR           Functional Mobility    Functional Mobility- Ind. Level  supervision required  -AR        Functional Mobility- Device  rolling walker  -AR           Transfer Assessment/Treatment    Transfer Assessment/Treatment  sit-stand transfer;stand-sit transfer;bed-chair transfer;chair-bed transfer  -AR        Comment (Transfers)  Cues to advance LLE during stand-to-sit transition. Educated pt on safe car transfer technique.   -AR           Bed-Chair Transfer    Bed-Chair [place] (Transfers)  supervision;verbal cues  -AR        Assistive Device (Bed-Chair Transfers)  walker, front-wheeled  -AR           Chair-Bed Transfer    Chair-Bed [place] (Transfers)  supervision;verbal cues  -AR        Assistive Device (Chair-Bed Transfers)  walker, front-wheeled  -AR           Sit-Stand Transfer    Sit-Stand [place] (Transfers)  supervision  -AR        Assistive Device (Sit-Stand Transfers)  walker, front-wheeled  -AR           Stand-Sit Transfer    Stand-Sit [place] (Transfers)  supervision;verbal cues  -AR        Assistive Device (Stand-Sit Transfers)  walker, front-wheeled  -AR           ADL Assessment/Intervention    27702 - OT Self Care/Mgmt Minutes  12  -AR        BADL Assessment/Intervention  bathing;upper body dressing;lower body dressing  -AR           Bathing Assessment/Intervention    Comment (Bathing)  Pt has self-care kit at home. Reviewed LLE PHP and incorporation into LBB, including use of sponge. Pt verbalized understanding.   -AR           Upper Body Dressing Assessment/Training    Upper Body Dressing [place] Level  doff;don;front opening garment;supervision;set up  -AR        Upper Body Dressing Position  supported sitting  -AR            Lower Body Dressing Assessment/Training    Lower Body Dressing [place] Level  don;pants/bottoms;undergarment;contact guard assist;verbal cues;socks;moderate assist (50% patient effort);shoes/slippers;minimum assist (75% patient effort) socks damp and sticking to sock aide  -AR        Assistive Devices (Lower Body Dressing)  one hand technique;reacher;sock-aid;long-handled shoe horn  -AR        Lower Body Dressing Position  supported sitting  -AR        Comment (Lower Body Dressing)  Pt recalling 2/3 LLE PHP. OT reviewed precautions and ADL retraining to incorporate, including valerio-dressing and AE use. Reviewed use of all ADL items in self-care kit.   -AR           BADL Safety/Performance    Impairments, BADL Safety/Performance  pain LLE PHP  -AR        Skilled BADL Treatment/Intervention  adaptive equipment training;BADL process/adaptation training;compensatory training;valerio/one-hand technique  -AR        Progress in BADL Status  improvement noted  -AR           General ROM    GENERAL ROM COMMENTS  WFL BUE  -AR           MMT (Manual Muscle Testing)    General MMT Comments  WFL BUE  -AR           Motor Assessment/Interventions    Additional Documentation  Balance (Group);Balance Interventions (Group)  -AR           Balance    Balance  static sitting balance;static standing balance  -AR           Static Sitting Balance    Level of [place] (Unsupported Sitting, Static Balance)  independent  -AR        Sitting Position (Unsupported Sitting, Static Balance)  sitting in chair  -AR        Time Able to Maintain Position (Unsupported Sitting, Static Balance)  more than 5 minutes  -AR           Static Standing Balance    Level of [place] (Supported Standing, Static Balance)  supervision  -AR        Time Able to Maintain Position (Supported Standing, Static Balance)  45 to 60 seconds  -AR        Assistive Device Utilized (Supported Standing, Static Balance)  walker, rolling  -AR           Sensory  Assessment/Intervention    Sensory General Assessment  no sensation deficits identified WFL BUE  -AR           Positioning and Restraints    Pre-Treatment Position  sitting in chair/recliner  -AR        Post Treatment Position  chair  -AR        In Chair  reclined;call light within reach;encouraged to call for assist;exit alarm on;with family/caregiver;legs elevated  -AR           Pain Assessment    Additional Documentation  Pain Scale: Numbers Pre/Post-Treatment (Group)  -AR           Pain Scale: Numbers Pre/Post-Treatment    Pain Scale: Numbers, Pretreatment  1/10  -AR        Pain Scale: Numbers, Post-Treatment  3/10  -AR        Pain Location - Side  Left  -AR        Pain Location - Orientation  incisional  -AR        Pain Location  hip  -AR        Pain Intervention(s)  Cold applied;Repositioned;Ambulation/increased activity  -AR           Wound 02/12/20 1444 Left anterior greater trochanter Incision    Wound - Properties Group Date first assessed: 02/12/20  -TG Time first assessed: 1444  -TG Side: Left  -TG Orientation: anterior  -TG Location: greater trochanter  -TG Primary Wound Type: Incision  -TG       Plan of Care Review    Plan of Care Reviewed With  patient  -AR        Progress  improving  -AR           Clinical Impression (OT)    Therapy Frequency (OT Eval)  evaluation only  -AR        Anticipated Discharge Disposition (OT)  home with home health;home with assist  -AR           Vital Signs    Pre Patient Position  Sitting  -AR        Intra Patient Position  Standing  -AR        Post Patient Position  Sitting  -AR           OT Goals    Transfer Goal Selection (OT)  transfer, OT goal 1  -AR        Dressing Goal Selection (OT)  dressing, OT goal 1  -AR        Precaution Goal Selection (OT)  precaution, OT goal 1  -AR        Additional Documentation  Precaution Goal Selection (OT) (Row)  -AR           Transfer Goal 1 (OT)    Activity/Assistive Device (Transfer Goal 1, OT)   sit-to-stand/stand-to-sit;bed-to-chair/chair-to-bed;walker, rolling  -AR        [place] Level/Cues Needed (Transfer Goal 1, OT)  supervision required;verbal cues required  -AR        Time Frame (Transfer Goal 1, OT)  short term goal (STG);1 day  -AR        Progress/Outcome (Transfer Goal 1, OT)  goal met  -AR           Dressing Goal 1 (OT)    Activity/Assistive Device (Dressing Goal 1, OT)  lower body dressing;reacher;long handled shoe horn  -AR        [place]/Cues Needed (Dressing Goal 1, OT)  minimum assist (75% or more patient effort);verbal cues required  -AR        Time Frame (Dressing Goal 1, OT)  short term goal (STG);1 day  -AR        Progress/Outcome (Dressing Goal 1, OT)  goal met  -AR           Precaution Goal 1 (OT)    Activity (Precaution Goal 1, OT)  hip precautions;during ADLs  -AR        [place] Level/Cues Needed (Precautions Goal 1, OT)  with minimum;verbal cues/redirection  -AR        Time Frame (Precaution Goal 1, OT)  short term goal (STG);1 day  -AR        Progress/Outcome (Precaution Goal 1, OT)  goal met  -AR           Discharge Summary (Occupational Therapy)    Additional Documentation  Discharge Summary, OT Eval (Group)  -AR           Discharge Summary, OT Eval    Reason for Discharge (OT Discharge Summary)  patient met all goals and outcomes  -AR        Outcomes Achieved Upon Discharge (OT Discharge Summary)  able to achieve all goals within established timeline  -AR           Living Environment    Home Accessibility  wheelchair accessible  -AR          User Key  (r) = Recorded By, (t) = Taken By, (c) = Cosigned By    Initials Name Effective Dates    AR Gerri Vasquez OT 06/22/15 -     TG Benita Munoz RN 06/07/19 -               OT Recommendation and Plan  Outcome Summary/Treatment Plan (OT)  Anticipated Discharge Disposition (OT): home with home health, home with assist  Reason for Discharge (OT Discharge Summary): patient met all goals and outcomes  Therapy  Frequency (OT Eval): evaluation only  Plan of Care Review  Plan of Care Reviewed With: patient  Plan of Care Reviewed With: patient  Outcome Summary: OT educated pt on LLE PHP and incorporation into ADL routine. Pt has self-care kit and OT reviewed use of ddevices. He completed bed mobility with supervision and transfer training with supervision. He completed LB dressing with min assist. Recommend DC home with HHOT and family assist.     Rehab Goal Summary     Row Name 02/13/20 1050             Occupational Therapy Goals    Transfer Goal Selection (OT)  transfer, OT goal 1  -AR      Dressing Goal Selection (OT)  dressing, OT goal 1  -AR      Precaution Goal Selection (OT)  precaution, OT goal 1  -AR         Transfer Goal 1 (OT)    Activity/Assistive Device (Transfer Goal 1, OT)  sit-to-stand/stand-to-sit;bed-to-chair/chair-to-bed;walker, rolling  -AR      [place] Level/Cues Needed (Transfer Goal 1, OT)  supervision required;verbal cues required  -AR      Time Frame (Transfer Goal 1, OT)  short term goal (STG);1 day  -AR      Progress/Outcome (Transfer Goal 1, OT)  goal met  -AR         Dressing Goal 1 (OT)    Activity/Assistive Device (Dressing Goal 1, OT)  lower body dressing;reacher;long handled shoe horn  -AR      [place]/Cues Needed (Dressing Goal 1, OT)  minimum assist (75% or more patient effort);verbal cues required  -AR      Time Frame (Dressing Goal 1, OT)  short term goal (STG);1 day  -AR      Progress/Outcome (Dressing Goal 1, OT)  goal met  -AR         Precaution Goal 1 (OT)    Activity (Precaution Goal 1, OT)  hip precautions;during ADLs  -AR      [place] Level/Cues Needed (Precautions Goal 1, OT)  with minimum;verbal cues/redirection  -AR      Time Frame (Precaution Goal 1, OT)  short term goal (STG);1 day  -AR      Progress/Outcome (Precaution Goal 1, OT)  goal met  -AR        User Key  (r) = Recorded By, (t) = Taken By, (c) = Cosigned By    Initials Name Provider Type Discipline    AR  Gerri Vasquez, OT Occupational Therapist OT          Outcome Measures     Row Name 02/13/20 1050             How much help from another is currently needed...    Putting on and taking off regular lower body clothing?  3  -AR      Bathing (including washing, rinsing, and drying)  3  -AR      Toileting (which includes using toilet bed pan or urinal)  3  -AR      Putting on and taking off regular upper body clothing  4  -AR      Taking care of personal grooming (such as brushing teeth)  4  -AR      Eating meals  4  -AR      AM-PAC 6 Clicks Score (OT)  21  -AR         Functional Assessment    Outcome Measure Options  AM-PAC 6 Clicks Daily Activity (OT)  -AR        User Key  (r) = Recorded By, (t) = Taken By, (c) = Cosigned By    Initials Name Provider Type    Gerri Gordon OT Occupational Therapist          Time Calculation:   Time Calculation- OT     Row Name 02/13/20 1050 02/13/20 0835          Time Calculation- OT    OT Start Time  1050  -AR  --     Total Timed Code Minutes- OT  12 minute(s)  -AR  --     OT Received On  02/13/20  -AR  --     OT Goal Re-Cert Due Date  02/23/20  -AR  --        Timed Charges    33814 - Gait Training Minutes   --  15  -CS     34251 - OT Self Care/Mgmt Minutes  12  -AR  --       User Key  (r) = Recorded By, (t) = Taken By, (c) = Cosigned By    Initials Name Provider Type    Gerri Gordon OT Occupational Therapist    Sunita Barnes, ANGELICA Physical Therapist        Therapy Suggested Charges     Code   Minutes Charges    07173 (CPT®) Hc Ot Neuromusc Re Education Ea 15 Min      59784 (CPT®) Hc Ot Ther Proc Ea 15 Min      42944 (CPT®) Hc Ot Therapeutic Act Ea 15 Min      26384 (CPT®) Hc Ot Manual Therapy Ea 15 Min      49002 (CPT®) Hc Ot Iontophoresis Ea 15 Min      64186 (CPT®) Hc Ot Elec Stim Ea-Per 15 Min      73890 (CPT®) Hc Ot Ultrasound Ea 15 Min      06509 (CPT®) Hc Ot Self Care/Mgmt/Train Ea 15 Min 12 1    Total  12 1        Therapy Charges for Today      Code Description Service Date Service Provider Modifiers Qty    94066189116 HC OT SELF CARE/MGMT/TRAIN EA 15 MIN 2/13/2020 Gerri Vasquez OT GO 1    13427295162 HC OT EVAL MOD COMPLEXITY 4 2/13/2020 Gerri Vasquez OT GO 1               OT Discharge Summary  Anticipated Discharge Disposition (OT): home with home health, home with assist  Reason for Discharge: All goals achieved  Outcomes Achieved: Able to achieve all goals within established timeline  Discharge Destination: Home with assist, Home with home health    Gerri Vasquez OT  2/13/2020

## 2020-02-13 NOTE — PROGRESS NOTES
"  SUBJECTIVE  Patient resting comfortably.  Pain well controlled.  No events overnight.  Ambulated with nursing yesterday evening with minimal pain.    OBJECTIVE  Temp (24hrs), Av.9 °F (36.6 °C), Min:97.6 °F (36.4 °C), Max:98.4 °F (36.9 °C)    Blood pressure 102/61, pulse 77, temperature 97.9 °F (36.6 °C), temperature source Oral, resp. rate 16, height 180.3 cm (71\"), weight 91.6 kg (201 lb 15.1 oz), SpO2 94 %.    Lab Results (last 24 hours)     Procedure Component Value Units Date/Time    CBC (No Diff) [[id number]]  (Abnormal) Collected:  20    Specimen:  Blood Updated:  2059     WBC 11.28 10*3/mm3      RBC 4.27 10*6/mm3      Hemoglobin 13.0 g/dL      Hematocrit 38.7 %      MCV 90.6 fL      MCH 30.4 pg      MCHC 33.6 g/dL      RDW 12.4 %      RDW-SD 40.8 fl      MPV 10.5 fL      Platelets 256 10*3/mm3     Basic Metabolic Panel [[id number]] Collected:  20    Specimen:  Blood Updated:  2049    POC Glucose Once [[id number]]  (Normal) Collected:  20 1049    Specimen:  Blood Updated:  20 1052     Glucose 85 mg/dL             PHYSICAL EXAM  Left lower extremity: Dressing clean, dry and intact.  Leg length symmetric.  Intact EHL, FHL, tibialis anterior, and gastrocsoleus. Sensation intact to light touch to deep peroneal, superficial peroneal, sural, saphenous, tibial nerves. 2+ palpable DP and PT pulses.         Primary osteoarthritis of left hip    Hyperlipidemia      PLAN / DISPOSITION:  1 Day Post-Op left total hip arthroplasty    Protected weight bearing as tolerated left lower extremity, posterior hip precautions x6 weeks  Pain control  PT/OT for post op mobilization and medical equipment needs   23 hours perioperative antibiotic prophylaxis   SCD's bilateral lower extremities   Aspirin for DVT prophylaxis   Social work for discharge planning.  Anticipate discharge home today.  Dressing to remain in place for 7 days. May remove on POD#7. If no drainage, may shower " on POD#10. No submerging wound in water. If drainage is noted, sterile dressing should be placed and wound checked daily. No showering until wound has remained dry for 72 consecutive hours.   Follow up in 3 weeks for re-assessment.      Dom Terry MD  02/13/20  7:24 AM

## 2020-02-13 NOTE — DISCHARGE PLACEMENT REQUEST
"Kameron Khan (54 y.o. Male)   Dickson Sanchez, RN Case Manager  [phone]    Date of Birth Social Security Number Address Home Phone MRN    1965  [address] [address]  [facility] [address] [phone] [id number]    Scientologist Marital Status          Moravian Single       Admission Date Admission Type Admitting Provider Attending Provider Department, Room/Bed    20 Elective Dom Terry MD Luckett, Matthew R, MD [facility] 3G, S354/1    Discharge Date Discharge Disposition Discharge Destination         Home or Self Care              Attending Provider:  Dom Terry MD    Allergies:  No Known Allergies    Isolation:  None   Infection:  None   Code Status:  CPR    Ht:  180.3 cm (71\")   Wt:  91.6 kg (201 lb 15.1 oz)    Admission Cmt:  None   Principal Problem:  Status post total replacement of left hip [Z96.642]                 Active Insurance as of 2020     Primary Coverage     Payor Plan Insurance Group Employer/Plan Group    [id number]     Payor Plan Address Payor Plan Phone Number Payor Plan Fax Number Effective Dates    PO    2019 - None Entered    [facility] [id number]       Subscriber Name Subscriber Birth Date Member ID       KAMERON KHAN 1965 [id number]                 Emergency Contacts      (Rel.) Home Phone Work Phone Mobile Phone    Paul Dowell (Brother) [phone] -- [phone]    anshu dowell (Other) -- -- [phone]               History & Physical      Dona Page MD at 20 1418          Patient Name: Kameron Khan  MRN: [id number]  : 1965  DOS: 2020    Attending: Dom Terry MD    Primary Care Provider: KARRIE Perez MD      Chief complaint: Left hip pain    Subjective   Patient is a 54 y.o. male presented for scheduled surgery by Dr. Terry.  He anticipates a left total hip replacement today.  His hip has been severely " painful for the last 6 months.  He denies the use of assistive device for ambulation or recent falls.    Seen preop he is doing well.  He denies pain or other complaints.  He denies nausea, shortness of breath or chest pain.  No history of DVT or PE.       Allergies:  No Known Allergies    Meds:  Medications Prior to Admission   Medication Sig Dispense Refill Last Dose   • acetaminophen (TYLENOL) 500 MG tablet Take 1,000 mg by mouth As Needed for Mild Pain .   2/11/2020 at 2200   • amitriptyline (ELAVIL) 10 MG tablet Take 12.5 mg by mouth At Night As Needed for Sleep.   1/29/2020   • Chlorhexidine Gluconate 4 % solution Shower with hibiclens solution as directed for 5 days prior to surgery 237 mL 0 2/11/2020 at Unknown time   • finasteride (PROSCAR) 5 MG tablet Take  by mouth Daily. 1/3 of tablet   2/5/2020 at Unknown time   • Loratadine 10 MG capsule Take 10 mg by mouth Daily As Needed (allergies).   2/5/2020 at Unknown time   • Misc Natural Products (OSTEO BI-FLEX JOINT SHIELD) tablet Take 2 tablets by mouth Daily.   2/5/2020 at Unknown time   • Multiple Vitamins-Minerals (MULTIVITAMIN ADULT PO) Take 1 tablet by mouth Daily.   2/5/2020 at Unknown time   • mupirocin (BACTROBAN) 2 % ointment Apply pea size amount to each nostril twice daily for 5 days prior to surgery 22 g 0 2/11/2020 at 2200   • naproxen (NAPROSYN) 500 MG tablet Take 500 mg by mouth 2 (Two) Times a Day With Meals.   2/5/2020   • pravastatin (PRAVACHOL) 40 MG tablet Take 40 mg by mouth Daily.   2/5/2020   • psyllium (METAMUCIL) 58.6 % packet Take 1 packet by mouth Daily.   2/5/2020       History:   Past Medical History:   Diagnosis Date   • Alcohol use 5/24/2017   • Allergic    • Arthritis 5/24/2017   • Diverticulitis of sigmoid colon 05/24/2017   • Elevated liver enzymes 5/24/2017   • History of transfusion     s/p hiatal hernia surgery- no reaction to blood   • Hyperglycemia 5/24/2017   • Hyperlipidemia    • IGT (impaired glucose tolerance)  "5/24/2017   • Leukocytosis 05/24/2017   • Wears glasses     Readers      Past Surgical History:   Procedure Laterality Date   • ABDOMINAL SURGERY      pt reports hx years ago a surgery for hiatal hernia with esophageal reconstruction, \"I can't throw up\"   • COLONOSCOPY  2018   • ENDOSCOPY     • FOOT SURGERY Right     Chilectomy and osteotomy- De[facility]nore   • HERNIA REPAIR     • KNEE SURGERY Left     Medial meniscus sx     Family History   Problem Relation Age of Onset   • Heart failure Mother    • Diabetes Mother    • Stroke Mother    • Cancer Mother    • Osteoarthritis Mother    • Hypertension Father    • Heart failure Father      Social History     Tobacco Use   • Smoking status: Light Tobacco Smoker     Types: Cigars   • Smokeless tobacco: Never Used   • Tobacco comment: cigar rarely; never smoked cigarettes    Substance Use Topics   • Alcohol use: Yes     Comment: 1 -2 drinks daily on average   • Drug use: No   He lives alone and has 1 child.  He is a contractor.    Review of Systems  Pertinent items are noted in HPI, all other systems reviewed and negative    Vital Signs  Visit Vitals  /90 (BP Location: Right arm, Patient Position: Lying)   Pulse 86   Temp 98.4 °F (36.9 °C) (Temporal)   Resp 16   Ht 180.3 cm (71\")   Wt 91.6 kg (201 lb 15.1 oz)   SpO2 97%   BMI 28.17 kg/m²       Physical Exam:    General Appearance:    Alert, cooperative, in no acute distress   Head:    Normocephalic, without obvious abnormality, atraumatic   Eyes:            Lids and lashes normal, conjunctivae and sclerae normal, no   icterus, no pallor, corneas clear   Ears:    Ears appear intact with no abnormalities noted   Neck:   No adenopathy, supple, trachea midline, no thyromegaly   Lungs:     Clear to auscultation, respirations regular, even and                   unlabored    Heart:    Regular rhythm and normal rate, normal S1 and S2, no            murmur, no gallop   Abdomen:     Normal bowel sounds, no masses, no organomegaly, " soft        non-tender, non-distended, no guarding, no rebound                 tenderness   Genitalia:    Deferred   Extremities:   no edema, no cyanosis, no              redness   Pulses:   Pulses palpable and equal bilaterally   Skin:   No bleeding, bruising or rash   Neurologic:   Cranial nerves 2 - 12 grossly intact, sensation intact. Flexion and dorsiflexion intact bilateral feet.      I reviewed the patient's new clinical results.     Results for AMIRA FOSTER (MRN [id number]) as of 2/12/2020 14:20   Ref. Range 1/30/2020 14:44   Glucose Latest Ref Range: 65 - 99 mg/dL 88   Sodium Latest Ref Range: 136 - 145 mmol/L 141   Potassium Latest Ref Range: 3.5 - 5.2 mmol/L 4.4   CO2 Latest Ref Range: 22.0 - 29.0 mmol/L 30.0 (H)   Chloride Latest Ref Range: 98 - 107 mmol/L 100   Anion Gap Latest Ref Range: 5.0 - 15.0 mmol/L 11.0   Creatinine Latest Ref Range: 0.76 - 1.27 mg/dL 0.72 (L)   BUN Latest Ref Range: 6 - 20 mg/dL 19   BUN/Creatinine Ratio Latest Ref Range: 7.0 - 25.0  26.4 (H)   Calcium Latest Ref Range: 8.6 - 10.5 mg/dL 9.5   eGFR Non African Am Latest Ref Range: >60 mL/min/1.73 114   Hemoglobin A1C Latest Ref Range: 4.80 - 5.60 % 5.40   Protime Latest Ref Range: 11.2 - 14.3 Seconds 12.5   INR Latest Ref Range: 0.85 - 1.16  0.98   PTT Latest Ref Range: 24.0 - 37.0 seconds 28.4   WBC Latest Ref Range: 3.40 - 10.80 10*3/mm3 7.15   RBC Latest Ref Range: 4.14 - 5.80 10*6/mm3 5.15   Hemoglobin Latest Ref Range: 13.0 - 17.7 g/dL 15.6   Hematocrit Latest Ref Range: 37.5 - 51.0 % 46.8   RDW Latest Ref Range: 12.3 - 15.4 % 12.3   MCV Latest Ref Range: 79.0 - 97.0 fL 90.9   MCH Latest Ref Range: 26.6 - 33.0 pg 30.3   MCHC Latest Ref Range: 31.5 - 35.7 g/dL 33.3   MPV Latest Ref Range: 6.0 - 12.0 fL 10.1   Platelets Latest Ref Range: 140 - 450 10*3/mm3 300     Assessment and Plan:     Primary osteoarthritis of left hip    Hyperlipidemia      Plan  1. PT/OT- early ambulation postop  2. Pain control-prns   3.  IS-encourage  [facility]. [facility]  5. Bowel regimen  6. Resume home medications as appropriate  7. Monitor post-op labs  8. Discharge planning for home    Hyperlipidemia  -Continue home statin      FROYLAN Colin  02/12/20  2:19 PM     Above is noted, agree.  Patient underwent left total hip arthroplasty under spinal anesthesia with local periarticular block.  Procedure was done by Dr. Terry, was tolerated well, patient admitted for further management.  Seen in his room at surgery, doing well with good pain control, no nausea, vomiting, or shortness of breath.  Spine anesthesia effects are starting to subside.  Vital signs with a temperature of 98.4 heart rate 86 respiratory rate 16 and blood pressure 136/90  Awake, alert, oriented, not in acute distress.  Lungs: Clear to station bilaterally, no wheeze or rales.  Heart: Regular, S1-S2, no gallops  Abdomen: Soft, nontender nondistended, no hepatomegaly.  Extremities: Left hip with clean dry and intact Aquacel dressing.  Distal lower extremities with intact pulses and cap refill.  Decreased sensation in the lower extremities under spinal fusion.  Patient started to have movement in his feet and is able to flex and dorsiflex both feet.    A.S/p left ANDRA.  I reviewed with patient postoperative management plan.  He expressed understanding and agreement.wy.    Electronically signed by Dona Page MD at 02/13/20 0907     Kira Be APRN at 02/12/20 1027     Attestation signed by Dom Terry MD at 02/12/20 1314    Agree.  Proceed with left total hip arthroplasty.                  Pre-Op H&P  Kameron Khan  [id number]  1965    Chief complaint: Left hip pain    HPI:    Patient is a 54 y.o.male who presents with left hip pain for 20 years. Onset onset has been atraumatic and gradual in nature. Pain is localized to groin, lateral trochanter and gluteal region and is a 7/10 on the pain scale. Pain is described as dull,  aching and stabbing. Associated symptoms include pain, stiffness and giving way/buckling. The pain is worse with walking, standing, sitting, climbing stairs and sleeping; resting improve s the pain. Previous treatments have included: NSAIDS and physical therapy since symptom onset. Although some transient relief was reported with these interventions, these conservative measures have failed and symptoms have persisted. The patient is limited in daily activities and has had a significant decrease in quality of life as a result.  Surgical intervention is recommended and he is agreeable. He is here today for a left total hip arthroplasty.     Review of Systems:  General ROS: negative for chills, fever or skin lesions;  No changes since last office visit  Cardiovascular ROS: no chest pain or dyspnea on exertion; +dyslipidemia  Respiratory ROS: no cough, shortness of breath, or wheezing; +cigar smoker    Allergies: No Known Allergies    Home Meds:    No current facility-administered medications on file prior to encounter.      Current Outpatient Medications on File Prior to Encounter   Medication Sig Dispense Refill   • acetaminophen (TYLENOL) 500 MG tablet Take 500 mg by mouth As Needed for Mild Pain .     • amitriptyline (ELAVIL) 10 MG tablet Take 12.5 mg by mouth At Night As Needed for Sleep.     • finasteride (PROSCAR) 5 MG tablet Take  by mouth Daily. 1/3 of tablet     • Multiple Vitamins-Minerals (MULTIVITAMIN ADULT PO) Take 1 tablet by mouth Daily.     • naproxen (NAPROSYN) 500 MG tablet Take 500 mg by mouth 2 (Two) Times a Day With Meals.     • pravastatin (PRAVACHOL) 40 MG tablet Take 40 mg by mouth Daily.         PMH:   Past Medical History:   Diagnosis Date   • Alcohol use 5/24/2017   • Allergic    • Arthritis 5/24/2017   • Diverticulitis of sigmoid colon 05/24/2017   • Elevated liver enzymes 5/24/2017   • History of transfusion     s/p hiatal hernia surgery- no reaction to blood   • Hyperglycemia 5/24/2017   •  "Hyperlipidemia    • IGT (impaired glucose tolerance) 5/24/2017   • Leukocytosis 05/24/2017   • Wears glasses     Readers      PSH:    Past Surgical History:   Procedure Laterality Date   • ABDOMINAL SURGERY      pt reports hx years ago a surgery for hiatal hernia with esophageal reconstruction, \"I can't throw up\"   • COLONOSCOPY  2018   • ENDOSCOPY     • FOOT SURGERY Right     Chilectomy and osteotomy- De[facility]nore   • HERNIA REPAIR     • KNEE SURGERY Left     Medial meniscus sx         Social History:   Tobacco:   Social History     Tobacco Use   Smoking Status Light Tobacco Smoker   • Types: Cigars   Smokeless Tobacco Never Used   Tobacco Comment    cigar rarely; never smoked cigarettes       Alcohol:     Social History     Substance and Sexual Activity   Alcohol Use Yes    Comment: 1 -2 drinks daily on average       Vitals:           /90 (BP Location: Right arm, Patient Position: Lying)   Pulse 86   Temp 98.4 °F (36.9 °C) (Temporal)   Resp 16   Ht 180.3 cm (71\")   Wt 91.6 kg (201 lb 15.1 oz)   SpO2 97%   BMI 28.17 kg/m²      Physical Exam:  General Appearance:    Alert, cooperative, no distress, appears stated age   Head:    Normocephalic, without obvious abnormality, atraumatic   Lungs:     Clear to auscultation bilaterally, respirations unlabored    Heart:   Regular rate and rhythm, S1 and S2 normal, no murmur, rub    or gallop    Abdomen:    Soft, non-tender, +bowel sounds   Breast Exam:    deferred   Genitalia:    deferred   Extremities:   Extremities normal, atraumatic, no cyanosis or edema   Skin:   Skin color, texture, turgor normal, no rashes or lesions   Neurologic:   Grossly intact   Results Review  LABS:  Lab Results   Component Value Date    WBC 7.15 01/30/2020    HGB 15.6 01/30/2020    HCT 46.8 01/30/2020    MCV 90.9 01/30/2020     01/30/2020    NEUTROABS 4.64 01/30/2020    GLUCOSE 88 01/30/2020    BUN 19 01/30/2020    CREATININE 0.72 (L) 01/30/2020    EGFRIFNONA 114 01/30/2020    NA " 141 01/30/2020    K 4.4 01/30/2020     01/30/2020    CO2 30.0 (H) 01/30/2020    MG 1.9 05/24/2017    CALCIUM 9.5 01/30/2020    ALBUMIN 3.80 05/24/2017    AST 28 05/24/2017    ALT 35 05/24/2017    BILITOT 0.7 05/24/2017       RADIOLOGY:  Imaging Results (Last 72 Hours)     ** No results found for the last 72 hours. **          I reviewed the patient's new clinical results.     1/30/20 ECG: reviewed, NSR    Cancer Staging (if applicable)  Cancer Patient: __ yes _X_no __unknown; If yes, clinical stage T:__ N:__M:__, stage group or __N/A    Impression: Primary osteoarthritis of left hip    Plan: Left total hip arthroplasty      Kira CANDIS Be, APRN   2/12/2020   10:36 AM    Electronically signed by Dom Terry MD at 02/12/20 1314       Vital Signs (last day)     Date/Time   Temp   Temp src   Pulse   Resp   BP   Patient Position   SpO2    02/13/20 0731   98.5 (36.9)   Oral   97   16   110/68   Lying   --    02/13/20 0448   97.9 (36.6)   Oral   77   16   102/61   Lying   94    02/13/20 0041   97.9 (36.6)   Oral   89   16   108/70   Lying   96    02/12/20 1959   97.8 (36.6)   Oral   96   16   117/84   Lying   95    02/12/20 1710   --   --   --   --   --   --   96    02/12/20 1708   97.6 (36.4)   Oral   --   --   119/79   --   --    02/12/20 1645   97.8 (36.6)   Temporal   56   16   106/81   --   99    02/12/20 1635   --   --   57   --   111/76   --   100    02/12/20 1630   97.8 (36.6)   Temporal   64   18   111/66   --   98    02/12/20 1626   97.8 (36.6)   --   68   18   112/76   --   92    02/12/20 1624   97.8 (36.6)   Temporal   70   16   112/76   Lying   96    02/12/20 1031   98.4 (36.9)   Temporal   86   16   136/90   Lying   97                Current Facility-Administered Medications   Medication Dose Route Frequency Provider Last Rate Last Dose   • acetaminophen (TYLENOL) tablet 1,000 mg  1,000 mg Oral Q6H Dom Terry MD   1,000 mg at 02/13/20 1141   • amitriptyline (ELAVIL) tablet 12.5 mg   12.5 mg Oral Nightly PRN Dom Terry MD       • aspirin EC tablet 325 mg  325 mg Oral Q12H Dom Terry MD   325 mg at 02/13/20 0949   • bisacodyl (DULCOLAX) EC tablet 10 mg  10 mg Oral Daily PRN Dom Terry MD       • bisacodyl (DULCOLAX) suppository 10 mg  10 mg Rectal Daily PRN Dom Terry MD       • docusate sodium (COLACE) capsule 100 mg  100 mg Oral BID PRN Dom Terry MD       • HYDROmorphone (DILAUDID) injection 0.5 mg  0.5 mg Intravenous Q2H PRN Dom Terry MD   0.5 mg at 02/12/20 2245    And   • naloxone (NARCAN) injection 0.1 mg  0.1 mg Intravenous Q5 Min PRN Dom Terry MD       • labetalol (NORMODYNE,TRANDATE) injection 10 mg  10 mg Intravenous Q4H PRN Gerri Moore, APRN       • lactated ringers infusion  9 mL/hr Intravenous Continuous Dom Terry MD 9 mL/hr at 02/12/20 1054     • lactated ringers infusion  100 mL/hr Intravenous Continuous Dom Terry MD   Stopped at 02/12/20 1833   • meloxicam (MOBIC) tablet 15 mg  15 mg Oral Daily Dom Terry MD   15 mg at 02/13/20 0949   • ondansetron (ZOFRAN) injection 4 mg  4 mg Intravenous Q6H PRN Gerri Moore, APRN       • ondansetron (ZOFRAN) tablet 4 mg  4 mg Oral Q6H PRN Dom Terry MD        Or   • ondansetron (ZOFRAN) injection 4 mg  4 mg Intravenous Q6H PRN Dom Terry MD   4 mg at 02/12/20 1852   • oxyCODONE (ROXICODONE) immediate release tablet 10 mg  10 mg Oral Q4H PRN Dom Terry MD   10 mg at 02/13/20 1138   • oxyCODONE (ROXICODONE) immediate release tablet 5 mg  5 mg Oral Q4H PRN Dom Terry MD   5 mg at 02/12/20 2015   • pravastatin (PRAVACHOL) tablet 40 mg  40 mg Oral Nightly Dom Terry MD   40 mg at 02/12/20 2015   • psyllium (METAMUCIL MULTIHEALTH FIBER) 58.12 % packet 1 packet  1 packet Oral Daily Dom Terry MD   1 packet at 02/13/20 0949   • sodium chloride 0.9 % bolus 500 mL  500 mL Intravenous TID PRN  Gerri Moore APRN       • sodium chloride 0.9 % infusion  100 mL/hr Intravenous Continuous Dom Terry  mL/hr at 02/12/20 1832 100 mL/hr at 02/12/20 1832        Operative/Procedure Notes (last 24 hours) (Notes from 02/12/20 1339 through 02/13/20 1339)      Dom Terry MD at 02/12/20 1432        OPERATIVE REPORT     DATE OF PROCEDURE: 2/12/2020    SURGEON: Dom Terry M.D.     ASSISTANT(S): Circulator: Benita Munoz RN  Scrub Person: Nancy De Anda; Feroz Meyers  Physician assistant: Sunita Barone PA-C  Nursing Assistant: Dinora Sanders was utilized during the case to facilitate positioning the patient, exposure, retraction, placement of final components and definitive closure.    PREOPERATIVE DIAGNOSIS: Advanced degenerative joint disease of the left hip secondary to osteoarthritis    POSTOPERATIVE DIAGNOSIS: same     PROCEDURE: Left total Hip Arthroplasty     SURGICAL DETAILS:     APPROACH: Posterior    ANESTHESIA: Spinal plus local periarticular block    PREOPERATIVE ANTIBIOTICS: Ancef 2 g IV    TRANEXAMIC ACID: IV    ESTIMATED BLOOD LOSS: 250 cc     SPECIMENS: None    IMPLANTS:   : Cyndie  Acetabular component: 54 mm Trident 2   Acetabular screws: 2  Acetabular liner: 0 degree x3   Femoral component: Accolade  degree size 7  Femoral head: 36+ 2.5 mm Biolox ceramic     DRAINS: None    LOCAL INJECTION: 1 cc Toradol 30mg/ml, 4 cc duramorph 2mg/ml, 20 cc 0.5% ropivicaine, 20 cc 0.5% lidocaine with 1:200,000 epinephrine, 15 cc preservative free normal saline     MODIFIER(S): None    COMPLICATIONS: None apparent    INDICATIONS FOR PROCEDURE: This patient has a history of progressive left hip pain and arthritis. The hip pain is severe with activity and has progressed significantly. Non-operative treatment has been attempted, but has not improved or controlled symptoms during normal daily activities. Motion has become limited  and rotation severely restricted. X-rays reveal moderate-to-severe eburnation of articular cartilage on the superior weight bearing surface of the hip with circumferential acetabular and femoral neck osteophytes consistent with advanced hip osteoarthritis. A total hip arthroplasty was recommended at this time. The risks, benefits, alternatives, and potential complications of the arthroplasty surgery were discussed with the patient in detail to include but not limited to infection, bleeding, anesthesia risks, sciatic nerve palsy, instability/dislocation, limb length discrepancy, aseptic loosening, osteolysis, blood clots, continued pain, iatrogenic fracture, myocardial infarction, stroke, and death. Specific details of the procedure, hospitalization, recovery, rehabilitation, and long-term precautions were also provided. Pre-operative teaching was provided. Implant/prosthesis selection was outlined, and the many options available were explained; the final choice will be made at the time of the procedure to match the anatomy and condition of the bone, ligaments, tendons, and muscles. Understanding of all topics was conveyed to me by the patient, and consent was given to proceed with a left total hip arthroplasty. The patient completed preoperative medical optimization and risk assessment, joint arthroplasty education, and MRSA decolonization using a universal decolonization protocol. Perioperative blood management and the potential for blood transfusion were discussed with risks and options clearly outlined.     INTRAOPERATIVE FINDINGS: End-stage osteoarthritis left hip    PROCEDURE: The patient was identified in the preoperative holding area. The operative site was confirmed and marked. ALPHONSO hose and a sequential compression device were placed on the nonoperative leg. The risks, benefits, and alternatives to surgery were again confirmed with the patient and the patient wished to proceed. The patient was brought to the  operating room and placed on the operating room table in the supine position. A huddle was performed with the patient and all vital surgical team members to confirm the correct operative site, procedure, anesthesia type, and operative plan with the patient. After anesthesia was performed, the patient was positioned in the lateral decubitus position on the pegboard and secured with the operative side up. An axillary roll was placed in the axilla and all bony prominences and pressure points were checked and padded. A relative leg length assessment was carried out and markers were placed for intraoperative assessment. Intravenous antibiotic prophylaxis was given and confirmed with the anesthesia team.     The operative leg was prepped and draped in the usual sterile fashion. A surgical time out was performed immediately preceding the incision with all personnel in the operating room to again confirm patient identity, the correct operative site and extremity, correct radiographic studies, availability of appropriate surgical equipment and agreement on the planned procedure. A posterolateral approach to the hip was performed through an incision centered over the greater trochanter. The incision was carried through the subcutaneous tissue to the underlying fascia macho and gluteus marysol fascia, which were incised and split posteriorly over the trochanter in the direction of the fibers. Hemostasis was obtained with electrocautery. The Charnley retractor was placed after carefully palpating the sciatic nerve which was protected throughout the case.     A standard posterior approach to the hip was performed by releasing the piriformis, short external rotators and posterior capsule and reflecting them posteriorly as a rectangular flap. The superior capsule was scarred down; it was released and excised. The labrum was split and the femoral head mobilized. The hip was then flexed, internally rotated and dislocated from the  acetabulum without excessive force. Assessment of the femoral head revealed eburnation of the articular cartilage with complete loss of the weight bearing chondral surface. Osteophytes were present as well. Careful measurements were performed using the center of the femoral head and the lesser trochanter as markers and a femoral neck cut was made according to the preoperative plan.     Attention was then turned to the acetabulum. Retractors were placed circumferentially for wide acetabular exposure. The labrum and osteophytes were debrided from the rim, and the medial wall was identified and the depth of the socket assessed by excising the pulvinar. Bleeders were controlled, especially the area of the obturator artery with the electrocautery. Acetabular reaming was then started with the hemispherical instrument matching the size of the excised femoral head. Sequential reaming of the acetabulum was then performed by increasing size in 2 mm increments.  Reaming was performed line to line. The reamers created an excellent hemispherical bed of bleeding cancellous bone. The cup was impacted into position, targeting 40-45 degrees of abduction and 20-25 degrees of anteversion, with an excellent press-fit. The press-fit was firm, stable, and apically seated.  2 screw(s) were used for additional support of the fixation. Further osteophyte debridement was done around the socket. All impinging soft tissue was removed from the edges of the socket. The polyethylene bearing/liner was then impacted into place and checked for stability.     Attention was then turned to the femur. The leg was positioned so access did not result in soft-tissue injury. The femoral preparation was started with a box osteotome. The medullary cavity of the femur was then entered and opened with hand reamers. Femoral stem broaches were then employed in an incremental fashion up to the final size, targeting 15-20 degrees of anteversion. The final broach  was fully seated, had good rotational and axial stability, and was seated at the appropriate height in relation to the greater trochanter and the preoperative plan. Trial reduction was done. Excellent stability and range of motion was achieved without impingement at any position. The hip was stable in full extension and external rotation as well as in flexion past 90 degrees, 20 degrees adduction and 60-70 degrees of internal rotation. Leg lengths were re-created within millimeters based on the markers and relative measurement. The hip was then dislocated and the trials removed. The wound was copiously irrigated, and the permanent femoral stem was then impacted down in approximately 15-20 degrees of anteversion. The press-fit was firm, and stable to axial and rotational force in all planes. The permanent femoral head was then impacted on the clean trunnion. The socket and wound were irrigated, suctioned, and inspected for debris. The final reduction was performed, and again leg length assessment and stability assessment of the hip were performed to confirm optimal component selection and stability in all planes without impingement when stressed to the extremes.     The wound was irrigated with dilute betadine solution as well as saline, and hemostasis obtained with electrocautery. A pain cocktail was injected into the pericapsular tissues. The posterior capsule, piriformis, and short external rotators were repaired utilizing #2 Ticron through drill holes in the greater trochanter. The sciatic nerve was palpated and found to be intact and the wound was irrigated. Instrument and sponge counts were completed and confirmed correct. The fascia macho and gluteal fascia were closed with interrupted #1 Vicryl suture and oversewn with #2 Stratafix. The deep subcutaneous tissue was closed with running #1 Stratafix suture and the superficial subcutaneous tissue with interrupted 2-0 Vicryl suture. A 3-0 monocryl running stitch  was used to close skin followed by skin glue adhesive to seal the wound. A silver impregnated dressing was then placed, followed by ALPHONSO hose and a sequential compression device to the operative limb, followed by an abduction pillow. The patient was then returned to a supine position on the operating room table. The patient was sufficiently recovered from anesthesia, transferred to a hospital bed and taken to the PACU in stable condition.     One gram (1000 mg) of intravenous tranexamic acid was administered prior to incision. A second one gram (1000 mg) intravenous dose was given prior to wound closure.    No apparent complications occurred during the procedure Instrument, sponge and needle counts were correct x 2.     The patient underwent risk stratification preoperatively and aspirin was chosen for DVT prophylaxis. Delay in starting chemical prophylaxis for 23 hours from surgical incision was over concerns for hematoma formation and wound related issues.     POST OPERATIVE PLAN:   Protected weight bearing as tolerated   Posterior hip precautions x 6 weeks   PT/OT for mobilization and medical equipment needs   23 hours perioperative antibiotic prophylaxis   Pain control with PO/IV meds   Keep silver dressing in place for 7 days post op. Change dressing only if saturated.   ALPHONSO hose and SCD's to bilateral lower extremities   Social work for discharge planning needs   Follow up in 3 weeks for post operative wound check with XR AP pelvis.      Electronically signed by Dom Terry MD at 02/12/20 1608     Dom Terry MD at 02/12/20 1432        TOTAL HIP ARTHROPLASTY  Progress Note    Kameron Vargas Bill  2/12/2020    Pre-op Diagnosis:   Primary osteoarthritis of left hip [M16.12]       Post-Op Diagnosis Codes:     * Primary osteoarthritis of left hip [M16.12]    Procedure/CPT® Codes:  [facility] TOTAL HIP ARTHROPLASTY [25118]    Procedure(s):  TOTAL HIP ARTHROPLASTY LEFT    Surgeon(s):  Dom Terry  "MD NABILA    Anesthesia: Spinal    Staff:   Circulator: Benita Munoz RN  Scrub Person: Nancy De Anda; Feroz Meyers  Physician assistant: Sunita Barone PA-C  Nursing Assistant: Dinora Sanders    Estimated Blood Loss: 250 mL    Urine Voided: * No values recorded between 2020  2:04 PM and 2020  4:07 PM *    Specimens:                None          Drains: * No LDAs found *    Findings: End-stage osteoarthritis left hip    Complications: None apparent      Dom Terry MD     Date: 2020  Time: 4:07 PM        Electronically signed by Dmo Terry MD at 20 1607          Physician Progress Notes (last 24 hours) (Notes from 20 1339 through 20 1339)      Dom Terry MD at 20 0724            SUBJECTIVE  Patient resting comfortably.  Pain well controlled.  No events overnight.  Ambulated with nursing yesterday evening with minimal pain.    OBJECTIVE  Temp (24hrs), Av.9 °F (36.6 °C), Min:97.6 °F (36.4 °C), Max:98.4 °F (36.9 °C)    Blood pressure 102/61, pulse 77, temperature 97.9 °F (36.6 °C), temperature source Oral, resp. rate 16, height 180.3 cm (71\"), weight 91.6 kg (201 lb 15.1 oz), SpO2 94 %.    Lab Results (last 24 hours)     Procedure Component Value Units Date/Time    CBC (No Diff) [[id number]]  (Abnormal) Collected:  20    Specimen:  Blood Updated:  20     WBC 11.28 10*3/mm3      RBC 4.27 10*6/mm3      Hemoglobin 13.0 g/dL      Hematocrit 38.7 %      MCV 90.6 fL      MCH 30.4 pg      MCHC 33.6 g/dL      RDW 12.4 %      RDW-SD 40.8 fl      MPV 10.5 fL      Platelets 256 10*3/mm3     Basic Metabolic Panel [[id number]] Collected:  20    Specimen:  Blood Updated:  20    POC Glucose Once [[id number]]  (Normal) Collected:  20 1049    Specimen:  Blood Updated:  20 1052     Glucose 85 mg/dL             PHYSICAL EXAM  Left lower extremity: Dressing clean, dry and intact.  Leg length " symmetric.  Intact EHL, FHL, tibialis anterior, and gastrocsoleus. Sensation intact to light touch to deep peroneal, superficial peroneal, sural, saphenous, tibial nerves. 2+ palpable DP and PT pulses.         Primary osteoarthritis of left hip    Hyperlipidemia      PLAN / DISPOSITION:  1 Day Post-Op left total hip arthroplasty    Protected weight bearing as tolerated left lower extremity, posterior hip precautions x6 weeks  Pain control  PT/OT for post op mobilization and medical equipment needs   23 hours perioperative antibiotic prophylaxis   SCD's bilateral lower extremities   Aspirin for DVT prophylaxis   Social work for discharge planning.  Anticipate discharge home today.  Dressing to remain in place for 7 days. May remove on POD#7. If no drainage, may shower on POD#10. No submerging wound in water. If drainage is noted, sterile dressing should be placed and wound checked daily. No showering until wound has remained dry for 72 consecutive hours.   Follow up in 3 weeks for re-assessment.      Dom Terry MD  02/13/20  7:24 AM          Electronically signed by Dom Terry MD at 02/13/20 0775          Physical Therapy Notes (last 24 hours) (Notes from 02/12/20 1339 through 02/13/20 1339)      Sunita Lopez PT at 02/13/20 0835  Version 1 of 1         Problem: Patient Care Overview  Goal: Plan of Care Review  Outcome: Ongoing (interventions implemented as appropriate)  Flowsheets  Taken 2/13/2020 1039  Progress: improving  Taken 2/13/2020 0835  Plan of Care Reviewed With: patient  Outcome Summary: Pt able to amb 600' with RW with step through gait mechanics SBA. Pt limited by fatigue. Pt able to ascend/descend 1 step with RW SBA. Pt is cond indep with all bed mobility. Educated patient on HEP and posterior hip precautions. Recommend patient home with assist and home health PT. Pt's niece is staying with patient for 3 days at discharge. PADD: 13       Electronically signed by John  "Sunita, PT at 20 1039     Sunita Lopez, PT at 20 0835  Version 1 of 1         Patient Name: Kameron Khan  : 1965    MRN: [id number]                              Today's Date: 2020       Admit Date: 2020    Visit Dx:     ICD-10-CM ICD-9-CM   1. Status post total replacement of left hip Z96.642 V43.64   2. Primary osteoarthritis of left hip M16.12 715.15     Patient Active Problem List   Diagnosis   • Diverticulitis of sigmoid colon   • Leukocytosis   • Hyperlipidemia   • Elevated liver enzymes   • Tobacco use   • Arthritis   • Hyperglycemia   • Alcohol use   • Primary osteoarthritis of left hip   • Status post total replacement of left hip     Past Medical History:   Diagnosis Date   • Alcohol use 2017   • Allergic    • Arthritis 2017   • Diverticulitis of sigmoid colon 2017   • Elevated liver enzymes 2017   • History of transfusion     s/p hiatal hernia surgery- no reaction to blood   • Hyperglycemia 2017   • Hyperlipidemia    • IGT (impaired glucose tolerance) 2017   • Leukocytosis 2017   • Wears glasses     Readers      Past Surgical History:   Procedure Laterality Date   • ABDOMINAL SURGERY      pt reports hx years ago a surgery for hiatal hernia with esophageal reconstruction, \"I can't throw up\"   • COLONOSCOPY     • ENDOSCOPY     • FOOT SURGERY Right     Chilectomy and osteotomy- [facility]   • HERNIA REPAIR     • KNEE SURGERY Left     Medial meniscus sx   • TOTAL HIP ARTHROPLASTY Left 2020    Procedure: TOTAL HIP ARTHROPLASTY LEFT;  Surgeon: Dom Terry MD;  Location: [facility];  Service: Orthopedics;  Laterality: Left;     General Information     Row Name 20 0835          PT Evaluation Time/Intention    Document Type  evaluation;discharge evaluation/summary  -CS     Mode of Treatment  physical therapy  -CS     Row Name 20 0835          General Information    Patient Profile Reviewed?  yes  -CS  "    Prior Level of Function  min assist:;dressing;bathing;home management;ADL's;gait Mobility limited by L hip pain.  -CS     Existing Precautions/Restrictions  fall;left;hip, posterior  -CS     Barriers to Rehab  none identified  -CS     Row Name 02/13/20 0835          Relationship/Environment    Lives With  child(kane), dependent Pt lives with 7 year-old child but niece is coming to stay with patient for 3 days to assist and pt reports he has several friends and family that can assist at home as well.   -CS     Row Name 02/13/20 0835          Resource/Environmental Concerns    Current Living Arrangements  home/apartment/condo  -CS     Row Name 02/13/20 0835          Home Main Entrance    Number of Stairs, Main Entrance  one  -CS     Stair Railings, Main Entrance  none  -CS     Row Name 02/13/20 0835          Stairs Within Home, Primary    Stairs, Within Home, Primary  1 story house  -CS     Number of Stairs, Within Home, Primary  none  -CS     Row Name 02/13/20 0835          Cognitive Assessment/Intervention- PT/OT    Orientation Status (Cognition)  oriented x 4  -CS     Row Name 02/13/20 0835          Safety Issues, Functional Mobility    Impairments Affecting Function (Mobility)  endurance/activity tolerance;pain;range of motion (ROM);strength  -CS       User Key  (r) = Recorded By, (t) = Taken By, (c) = Cosigned By    Initials Name Provider Type    CS Sunita Lopez, PT Physical Therapist        Mobility     Row Name 02/13/20 0835          Bed Mobility Assessment/Treatment    Bed Mobility Assessment/Treatment  supine-sit  -CS     Supine-Sit [place] (Bed Mobility)  conditional independence;verbal cues  -CS     Assistive Device (Bed Mobility)  head of bed elevated  -CS     Comment (Bed Mobility)  VC's to move LE's to EOB. VC's to maintain posterior hip precautions with bed mobility.  -CS     Row Name 02/13/20 0835          Transfer Assessment/Treatment    Comment (Transfers)  VC's to push off of bed to  stand and to reach back for chair to sit. VC's to step L LE out with sitting to maintain posterior hip precautions.   -CS     Row Name 02/13/20 0835          Sit-Stand Transfer    Sit-Stand [place] (Transfers)  supervision;verbal cues  -CS     Assistive Device (Sit-Stand Transfers)  walker, front-wheeled  -CS     Row Name 02/13/20 0835          Gait/Stairs Assessment/Training    50001 - Gait Training Minutes   15  -CS     Gait/Stairs Assessment/Training  gait/ambulation independence;gait/ambulation assistive device;distance ambulated;gait pattern  -CS     [place] Level (Gait)  stand by assist;verbal cues  -CS     Assistive Device (Gait)  walker, front-wheeled  -CS     Distance in Feet (Gait)  600'  -CS     Pattern (Gait)  step-through  -CS     Deviations/Abnormal Patterns (Gait)  bilateral deviations;david decreased;gait speed decreased;stride length decreased  -CS     Negotiation (Stairs)  stairs independence;stairs assistive device;handrail location;number of steps  -CS     [place] Level (Stairs)  stand by assist  -CS     Assistive Device (Stairs)  walker, front-wheeled  -CS     Handrail Location (Stairs)  none  -CS     Number of Steps (Stairs)  1  -CS     Ascending Technique (Stairs)  step-to-step  -CS     Descending Technique (Stairs)  step-to-step  -CS     Comment (Gait/Stairs)  Pt able to amb 600' with RW SBA with step through gait mechanics. Pt limited by fatigue. VC's to increase heel strike on L LE, increase stance time on L LE, and to maintain upright posture. Pt able to correct with VC's. Pt able to ascend/descend 1 step with RW SBA with step to mechanics with VC's on sequencing.   -     Row Name 02/13/20 0835          Mobility Assessment/Intervention    Extremity Weight-bearing Status  left lower extremity  -CS     Left Lower Extremity (Weight-bearing Status)  weight-bearing as tolerated (WBAT)  -       User Key  (r) = Recorded By, (t) = Taken By, (c) = Cosigned By    Initials Name  Provider Type    Sunita Barnes PT Physical Therapist        Obj/Interventions     [facility] Name 02/13/20 0905          General ROM    GENERAL ROM COMMENTS  LE R ROM WFL. L hip ROM limited by 25%.  -[facility] Name 02/13/20 0905          MMT (Manual Muscle Testing)    General MMT Comments  L MMT grossly 4-/5. Pt able to indep perform SLR on L LE. R LE MMT WFL.   -[facility] Name 02/13/20 0905          Therapeutic Exercise    Lower Extremity (Therapeutic Exercise)  heel slides, left;LAQ (long arc quad), left;quad sets, left;SLR (straight leg raise), left  -CS     Lower Extremity Range of Motion (Therapeutic Exercise)  ankle dorsiflexion/plantar flexion, bilateral;hip abduction/adduction, left  -CS     Exercise Type (Therapeutic Exercise)  isometric contraction, static;AROM (active range of motion);AAROM (active assistive range of motion);isotonic contraction, concentric  -CS     Position (Therapeutic Exercise)  seated  -CS     Sets/Reps (Therapeutic Exercise)  15 reps each   -CS     Comment (Therapeutic Exercise)  VC's on technique. Pt able to actively dorsiflex.   -[facility] Name 02/13/20 0905          Sensory Assessment/Intervention    Sensory General Assessment  no sensation deficits identified  -       User Key  (r) = Recorded By, (t) = Taken By, (c) = Cosigned By    Initials Name Provider Type    Sunita Barnes PT Physical Therapist        Goals/Plan    No documentation.       Clinical Impression     [facility] Name 02/13/20 0835          Pain Assessment    Additional Documentation  Pain Scale: Numbers Pre/Post-Treatment (Group)  -CS     Row Name 02/13/20 0835          Pain Scale: Numbers Pre/Post-Treatment    Pain Scale: Numbers, Pretreatment  2/10  -CS     Pain Scale: Numbers, Post-Treatment  4/10  -CS     Pain Location - Side  Left  -CS     Pain Location - Orientation  incisional  -CS     Pain Location  hip  -CS     Pain Intervention(s)  Repositioned;Ambulation/increased activity;Cold applied  -      Row Name 02/13/20 0835          Plan of Care Review    Plan of Care Reviewed With  patient  -CS     Progress  improving  -CS     Outcome Summary  Pt able to amb 600' with RW with step through gait mechanics SBA. Pt limited by fatigue. Pt able to ascend/descend 1 step with RW SBA. Pt is cond indep with all bed mobility. Educated patient on HEP and posterior hip precautions. Recommend patient home with assist and home health PT. Pt's niece is staying with patient for 3 days at discharge. PADD: 13  -CS     Row Name 02/13/20 0835          Physical Therapy Clinical Impression    Patient/Family Goals Statement (PT Clinical Impression)  To go home.  -CS     Criteria for Skilled Interventions Met (PT Clinical Impression)  other (see comments) Pt is being discharged today.   -CS     Row Name 02/13/20 0835          Positioning and Restraints    Pre-Treatment Position  in bed  -CS     Post Treatment Position  chair  -CS     In Chair  notified nsg;reclined;call light within reach;encouraged to call for assist;exit alarm on;legs elevated  -CS       User Key  (r) = Recorded By, (t) = Taken By, (c) = Cosigned By    Initials Name Provider Type    CS Sunita Lopez, PT Physical Therapist        Outcome Measures     Row Name 02/13/20 0835          How much help from another person do you currently need...    Turning from your back to your side while in flat bed without using bedrails?  4  -CS     Moving from lying on back to sitting on the side of a flat bed without bedrails?  4  -CS     Moving to and from a bed to a chair (including a wheelchair)?  4  -CS     Standing up from a chair using your arms (e.g., wheelchair, bedside chair)?  4  -CS     Climbing 3-5 steps with a railing?  4  -CS     To walk in hospital room?  4  -CS     AM-PAC 6 Clicks Score (PT)  24  -CS     Row Name 02/13/20 0835          Functional Assessment    Outcome Measure Options  AM-PAC 6 Clicks Basic Mobility (PT)  -CS       User Key  (r) = Recorded By,  (t) = Taken By, (c) = Cosigned By    Initials Name Provider Type    CS Sunita Lopez, PT Physical Therapist          PT Recommendation and Plan     Outcome Summary/Treatment Plan (PT)  Anticipated Equipment Needs at Discharge (PT): other (see comments)(none)  Anticipated Discharge Disposition (PT): home with assist, home with home health  Plan of Care Reviewed With: patient  Progress: improving  Outcome Summary: Pt able to amb 600' with RW with step through gait mechanics SBA. Pt limited by fatigue. Pt able to ascend/descend 1 step with RW SBA. Pt is cond indep with all bed mobility. Educated patient on HEP and posterior hip precautions. Recommend patient home with assist and home health PT. Pt's niece is staying with patient for 3 days at discharge. PADD: 13     Time Calculation:   PT Charges     Row Name 02/13/20 0835             Time Calculation    Start Time  0835  -CS      PT Received On  02/13/20  -CS         Time Calculation- PT    Total Timed Code Minutes- PT  30 minute(s)  -CS         Timed Charges    56618 - PT Therapeutic Exercise Minutes  15  -CS      07352 - Gait Training Minutes   15  -CS        User Key  (r) = Recorded By, (t) = Taken By, (c) = Cosigned By    Initials Name Provider Type    CS Sunita Lopez, PT Physical Therapist        Therapy Charges for Today     Code Description Service Date Service Provider Modifiers Qty    49367975543 HC PT THER PROC EA 15 MIN 2/13/2020 Sunita Lopez, PT GP 1    65556056441 HC GAIT TRAINING EA 15 MIN 2/13/2020 Sunita Lopez, PT GP 1    84409907661 HC PT EVAL MOD COMPLEXITY 3 2/13/2020 Sunita Lopez, PT GP 1          PT G-Codes  Outcome Measure Options: AM-PAC 6 Clicks Basic Mobility (PT)  AM-PAC 6 Clicks Score (PT): 24    PT Discharge Summary  Anticipated Discharge Disposition (PT): home with assist, home with home health    Sunita Lopez PT  2/13/2020         Electronically signed by Sunita Lopez PT at 02/13/20 1048    "       Occupational Therapy Notes (last 24 hours) (Notes from 20 2529 through 20 1337)      Gerri Vasquez, OT at 20 1050          Acute Care - Occupational Therapy Initial Eval/Discharge   [place]     Patient Name: Kameron Khan  : 1965  MRN: [id number]  Today's Date: 2020               Admit Date: 2020       ICD-10-CM ICD-9-CM   1. Status post total replacement of left hip Z96.642 V43.64   2. Primary osteoarthritis of left hip M16.12 715.15   3. Impaired mobility and ADLs Z74.09 799.89     Patient Active Problem List   Diagnosis   • Diverticulitis of sigmoid colon   • Leukocytosis   • Hyperlipidemia   • Elevated liver enzymes   • Tobacco use   • Arthritis   • Hyperglycemia   • Alcohol use   • Primary osteoarthritis of left hip   • Status post total replacement of left hip     Past Medical History:   Diagnosis Date   • Alcohol use 2017   • Allergic    • Arthritis 2017   • Diverticulitis of sigmoid colon 2017   • Elevated liver enzymes 2017   • History of transfusion     s/p hiatal hernia surgery- no reaction to blood   • Hyperglycemia 2017   • Hyperlipidemia    • IGT (impaired glucose tolerance) 2017   • Leukocytosis 2017   • Wears glasses     Readers      Past Surgical History:   Procedure Laterality Date   • ABDOMINAL SURGERY      pt reports hx years ago a surgery for hiatal hernia with esophageal reconstruction, \"I can't throw up\"   • COLONOSCOPY     • ENDOSCOPY     • FOOT SURGERY Right     Chilectomy and osteotomy- [facility]   • HERNIA REPAIR     • KNEE SURGERY Left     Medial meniscus sx   • TOTAL HIP ARTHROPLASTY Left 2020    Procedure: TOTAL HIP ARTHROPLASTY LEFT;  Surgeon: Dom Terry MD;  Location: [facility];  Service: Orthopedics;  Laterality: Left;          OT ASSESSMENT FLOWSHEET (last 12 hours)      Occupational Therapy Evaluation     Row Name 20 1050                   OT Evaluation " Time/Intention    Subjective Information  no complaints  -AR        Document Type  evaluation;therapy note (daily note);discharge treatment  -AR        Mode of Treatment  occupational therapy  -AR           General Information    Patient Profile Reviewed?  yes  -AR        Prior Level of Function  min assist:;all household mobility;community mobility;gait;transfer;ADL's;independent:;driving  -AR        Existing Precautions/Restrictions  fall;hip, posterior  -AR           Relationship/Environment    Primary Source of Support/Comfort  friend;extended family  -AR        Lives With  child(kane), dependent  -AR        Concerns About Impact on Relationships  Pt shares custody with his 8 yo son, he will be staying with his mother for first few weeks. Pt's niece will be staying with him first few nights.   -AR           Resource/Environmental Concerns    Current Living Arrangements  home/apartment/condo  -AR        Resource/Environmental Concerns  none  -AR        Transportation Concerns  car, none  -AR           Cognitive Assessment/Interventions    Additional Documentation  Cognitive Assessment/Intervention (Group)  -AR           Cognitive Assessment/Intervention- PT/OT    Affect/Mental Status (Cognitive)  WNL  -AR        Orientation Status (Cognition)  oriented x 4  -AR        Follows Commands (Cognition)  WNL  -AR        Cognitive Function (Cognitive)  WNL  -AR           Safety Issues, Functional Mobility    Safety Issues Affecting Function (Mobility)  safety precaution awareness;safety precautions follow-through/compliance  -AR        Impairments Affecting Function (Mobility)  pain  -AR           Mobility Assessment/Treatment    Extremity Weight-bearing Status  left lower extremity  -AR        Left Lower Extremity (Weight-bearing Status)  weight-bearing as tolerated (WBAT)  -AR           Bed Mobility Assessment/Treatment    Bed Mobility Assessment/Treatment  supine-sit;sit-supine;scooting/bridging  -AR         Scooting/Bridging [place] (Bed Mobility)  supervision  -AR        Supine-Sit [place] (Bed Mobility)  supervision;verbal cues  -AR        Sit-Supine [place] (Bed Mobility)  supervision;verbal cues  -AR        Bed Mobility, Safety Issues  decreased use of legs for bridging/pushing  -AR        Assistive Device (Bed Mobility)  leg   -AR        Comment (Bed Mobility)  Issued leg  and educated pt on use  -AR           Functional Mobility    Functional Mobility- Ind. Level  supervision required  -AR        Functional Mobility- Device  rolling walker  -AR           Transfer Assessment/Treatment    Transfer Assessment/Treatment  sit-stand transfer;stand-sit transfer;bed-chair transfer;chair-bed transfer  -AR        Comment (Transfers)  Cues to advance LLE during stand-to-sit transition. Educated pt on safe car transfer technique.   -AR           Bed-Chair Transfer    Bed-Chair [place] (Transfers)  supervision;verbal cues  -AR        Assistive Device (Bed-Chair Transfers)  walker, front-wheeled  -AR           Chair-Bed Transfer    Chair-Bed [place] (Transfers)  supervision;verbal cues  -AR        Assistive Device (Chair-Bed Transfers)  walker, front-wheeled  -AR           Sit-Stand Transfer    Sit-Stand [place] (Transfers)  supervision  -AR        Assistive Device (Sit-Stand Transfers)  walker, front-wheeled  -AR           Stand-Sit Transfer    Stand-Sit [place] (Transfers)  supervision;verbal cues  -AR        Assistive Device (Stand-Sit Transfers)  walker, front-wheeled  -AR           ADL Assessment/Intervention    61637 - OT Self Care/Mgmt Minutes  12  -AR        BADL Assessment/Intervention  bathing;upper body dressing;lower body dressing  -AR           Bathing Assessment/Intervention    Comment (Bathing)  Pt has self-care kit at home. Reviewed LLE PHP and incorporation into LBB, including use of sponge. Pt verbalized understanding.   -AR           Upper Body Dressing  Assessment/Training    Upper Body Dressing [place] Level  doff;don;front opening garment;supervision;set up  -AR        Upper Body Dressing Position  supported sitting  -AR           Lower Body Dressing Assessment/Training    Lower Body Dressing [place] Level  don;pants/bottoms;undergarment;contact guard assist;verbal cues;socks;moderate assist (50% patient effort);shoes/slippers;minimum assist (75% patient effort) socks damp and sticking to sock aide  -AR        Assistive Devices (Lower Body Dressing)  one hand technique;reacher;sock-aid;long-handled shoe horn  -AR        Lower Body Dressing Position  supported sitting  -AR        Comment (Lower Body Dressing)  Pt recalling 2/3 LLE PHP. OT reviewed precautions and ADL retraining to incorporate, including valerio-dressing and AE use. Reviewed use of all ADL items in self-care kit.   -AR           BADL Safety/Performance    Impairments, BADL Safety/Performance  pain LLE PHP  -AR        Skilled BADL Treatment/Intervention  adaptive equipment training;BADL process/adaptation training;compensatory training;valerio/one-hand technique  -AR        Progress in BADL Status  improvement noted  -AR           General ROM    GENERAL ROM COMMENTS  WFL BUE  -AR           MMT (Manual Muscle Testing)    General MMT Comments  WFL BUE  -AR           Motor Assessment/Interventions    Additional Documentation  Balance (Group);Balance Interventions (Group)  -AR           Balance    Balance  static sitting balance;static standing balance  -AR           Static Sitting Balance    Level of [place] (Unsupported Sitting, Static Balance)  independent  -AR        Sitting Position (Unsupported Sitting, Static Balance)  sitting in chair  -AR        Time Able to Maintain Position (Unsupported Sitting, Static Balance)  more than 5 minutes  -AR           Static Standing Balance    Level of [place] (Supported Standing, Static Balance)  supervision  -AR        Time Able to Maintain  Position (Supported Standing, Static Balance)  45 to 60 seconds  -AR        Assistive Device Utilized (Supported Standing, Static Balance)  walker, rolling  -AR           Sensory Assessment/Intervention    Sensory General Assessment  no sensation deficits identified WFL BUE  -AR           Positioning and Restraints    Pre-Treatment Position  sitting in chair/recliner  -AR        Post Treatment Position  chair  -AR        In Chair  reclined;call light within reach;encouraged to call for assist;exit alarm on;with family/caregiver;legs elevated  -AR           Pain Assessment    Additional Documentation  Pain Scale: Numbers Pre/Post-Treatment (Group)  -AR           Pain Scale: Numbers Pre/Post-Treatment    Pain Scale: Numbers, Pretreatment  1/10  -AR        Pain Scale: Numbers, Post-Treatment  3/10  -AR        Pain Location - Side  Left  -AR        Pain Location - Orientation  incisional  -AR        Pain Location  hip  -AR        Pain Intervention(s)  Cold applied;Repositioned;Ambulation/increased activity  -AR           Wound 02/12/20 1444 Left anterior greater trochanter Incision    Wound - Properties Group Date first assessed: 02/12/20  -TG Time first assessed: 1444  -TG Side: Left  -TG Orientation: anterior  -TG Location: greater trochanter  -TG Primary Wound Type: Incision  -TG       Plan of Care Review    Plan of Care Reviewed With  patient  -AR        Progress  improving  -AR           Clinical Impression (OT)    Therapy Frequency (OT Eval)  evaluation only  -AR        Anticipated Discharge Disposition (OT)  home with home health;home with assist  -AR           Vital Signs    Pre Patient Position  Sitting  -AR        Intra Patient Position  Standing  -AR        Post Patient Position  Sitting  -AR           OT Goals    Transfer Goal Selection (OT)  transfer, OT goal 1  -AR        Dressing Goal Selection (OT)  dressing, OT goal 1  -AR        Precaution Goal Selection (OT)  precaution, OT goal 1  -AR         Additional Documentation  Precaution Goal Selection (OT) (Row)  -AR           Transfer Goal 1 (OT)    Activity/Assistive Device (Transfer Goal 1, OT)  sit-to-stand/stand-to-sit;bed-to-chair/chair-to-bed;walker, rolling  -AR        [place] Level/Cues Needed (Transfer Goal 1, OT)  supervision required;verbal cues required  -AR        Time Frame (Transfer Goal 1, OT)  short term goal (STG);1 day  -AR        Progress/Outcome (Transfer Goal 1, OT)  goal met  -AR           Dressing Goal 1 (OT)    Activity/Assistive Device (Dressing Goal 1, OT)  lower body dressing;reacher;long handled shoe horn  -AR        [place]/Cues Needed (Dressing Goal 1, OT)  minimum assist (75% or more patient effort);verbal cues required  -AR        Time Frame (Dressing Goal 1, OT)  short term goal (STG);1 day  -AR        Progress/Outcome (Dressing Goal 1, OT)  goal met  -AR           Precaution Goal 1 (OT)    Activity (Precaution Goal 1, OT)  hip precautions;during ADLs  -AR        [place] Level/Cues Needed (Precautions Goal 1, OT)  with minimum;verbal cues/redirection  -AR        Time Frame (Precaution Goal 1, OT)  short term goal (STG);1 day  -AR        Progress/Outcome (Precaution Goal 1, OT)  goal met  -AR           Discharge Summary (Occupational Therapy)    Additional Documentation  Discharge Summary, OT Eval (Group)  -AR           Discharge Summary, OT Eval    Reason for Discharge (OT Discharge Summary)  patient met all goals and outcomes  -AR        Outcomes Achieved Upon Discharge (OT Discharge Summary)  able to achieve all goals within established timeline  -AR           Living Environment    Home Accessibility  wheelchair accessible  -AR          User Key  (r) = Recorded By, (t) = Taken By, (c) = Cosigned By    Initials Name Effective Dates    AR Gerri Vasquez OT 06/22/15 -     TG Benita Munoz RN 06/07/19 -               OT Recommendation and Plan  Outcome Summary/Treatment Plan (OT)  Anticipated Discharge  Disposition (OT): home with home health, home with assist  Reason for Discharge (OT Discharge Summary): patient met all goals and outcomes  Therapy Frequency (OT Eval): evaluation only  Plan of Care Review  Plan of Care Reviewed With: patient  Plan of Care Reviewed With: patient  Outcome Summary: OT educated pt on LLE PHP and incorporation into ADL routine. Pt has self-care kit and OT reviewed use of ddevices. He completed bed mobility with supervision and transfer training with supervision. He completed LB dressing with min assist. Recommend DC home with HHOT and family assist.     Rehab Goal Summary     Row Name 02/13/20 1050             Occupational Therapy Goals    Transfer Goal Selection (OT)  transfer, OT goal 1  -AR      Dressing Goal Selection (OT)  dressing, OT goal 1  -AR      Precaution Goal Selection (OT)  precaution, OT goal 1  -AR         Transfer Goal 1 (OT)    Activity/Assistive Device (Transfer Goal 1, OT)  sit-to-stand/stand-to-sit;bed-to-chair/chair-to-bed;walker, rolling  -AR      [place] Level/Cues Needed (Transfer Goal 1, OT)  supervision required;verbal cues required  -AR      Time Frame (Transfer Goal 1, OT)  short term goal (STG);1 day  -AR      Progress/Outcome (Transfer Goal 1, OT)  goal met  -AR         Dressing Goal 1 (OT)    Activity/Assistive Device (Dressing Goal 1, OT)  lower body dressing;reacher;long handled shoe horn  -AR      [place]/Cues Needed (Dressing Goal 1, OT)  minimum assist (75% or more patient effort);verbal cues required  -AR      Time Frame (Dressing Goal 1, OT)  short term goal (STG);1 day  -AR      Progress/Outcome (Dressing Goal 1, OT)  goal met  -AR         Precaution Goal 1 (OT)    Activity (Precaution Goal 1, OT)  hip precautions;during ADLs  -AR      [place] Level/Cues Needed (Precautions Goal 1, OT)  with minimum;verbal cues/redirection  -AR      Time Frame (Precaution Goal 1, OT)  short term goal (STG);1 day  -AR      Progress/Outcome  (Precaution Goal 1, OT)  goal met  -AR        User Key  (r) = Recorded By, (t) = Taken By, (c) = Cosigned By    Initials Name Provider Type Discipline    Gerri Gordon OT Occupational Therapist OT          Outcome Measures     Row Name 02/13/20 1050             How much help from another is currently needed...    Putting on and taking off regular lower body clothing?  3  -AR      Bathing (including washing, rinsing, and drying)  3  -AR      Toileting (which includes using toilet bed pan or urinal)  3  -AR      Putting on and taking off regular upper body clothing  4  -AR      Taking care of personal grooming (such as brushing teeth)  4  -AR      Eating meals  4  -AR      AM-PAC 6 Clicks Score (OT)  21  -AR         Functional Assessment    Outcome Measure Options  AM-PAC 6 Clicks Daily Activity (OT)  -AR        User Key  (r) = Recorded By, (t) = Taken By, (c) = Cosigned By    Initials Name Provider Type    Gerri Gordon OT Occupational Therapist          Time Calculation:   Time Calculation- OT     Row Name 02/13/20 1050 02/13/20 0835          Time Calculation- OT    OT Start Time  1050  -AR  --     Total Timed Code Minutes- OT  12 minute(s)  -AR  --     OT Received On  02/13/20  -AR  --     OT Goal Re-Cert Due Date  02/23/20  -AR  --        Timed Charges    48666 - Gait Training Minutes   --  15  -CS     85773 - OT Self Care/Mgmt Minutes  12  -AR  --       User Key  (r) = Recorded By, (t) = Taken By, (c) = Cosigned By    Initials Name Provider Type    Gerri Gordon OT Occupational Therapist    Sunita Barnes PT Physical Therapist        Therapy Suggested Charges     Code   Minutes Charges    75339 (CPT®) Hc Ot Neuromusc Re Education Ea 15 Min      36384 (CPT®) Hc Ot Ther Proc Ea 15 Min      13312 (CPT®) Hc Ot Therapeutic Act Ea 15 Min      20903 (CPT®) Hc Ot Manual Therapy Ea 15 Min      90931 (CPT®) Hc Ot Iontophoresis Ea 15 Min      02666 (CPT®) Hc Ot Elec Stim Ea-Per 15 Min       97028 (CPT®) Hc Ot Ultrasound Ea 15 Min      68683 (CPT®) Hc Ot Self Care/Mgmt/Train Ea 15 Min 12 1    Total  12 1        Therapy Charges for Today     Code Description Service Date Service Provider Modifiers Qty    55353346584 HC OT SELF CARE/MGMT/TRAIN EA 15 MIN 2/13/2020 Gerri Vasquez OT GO 1    22230643843 HC OT EVAL MOD COMPLEXITY 4 2/13/2020 Gerri Vasquez OT GO 1               OT Discharge Summary  Anticipated Discharge Disposition (OT): home with home health, home with assist  Reason for Discharge: All goals achieved  Outcomes Achieved: Able to achieve all goals within established timeline  Discharge Destination: Home with assist, Home with home health    Gerri Vasquez OT  2/13/2020    Electronically signed by Gerri Vasquez OT at 02/13/20 1333     Gerri Vasquez OT at 02/13/20 1050          Problem: Patient Care Overview  Goal: Plan of Care Review  2/13/2020 1327 by Gerri Vasquez OT  Flowsheets  Taken 2/13/2020 1050  Plan of Care Reviewed With: patient  Taken 2/13/2020 1327  Outcome Summary: OT educated pt on LLE PHP and incorporation into ADL routine. Pt has self-care kit and OT reviewed use of devices. He completed bed mobility with supervision and transfer training with supervision. He completed LB dressing with min assist. Recommend DC home with HHOT and family assist. CM notified of need for HHOT.        Electronically signed by Gerri Vasquez OT at 02/13/20 5628

## 2020-02-13 NOTE — PLAN OF CARE
Problem: Patient Care Overview  Goal: Plan of Care Review  Outcome: Ongoing (interventions implemented as appropriate)  Flowsheets  Taken 2/13/2020 1039  Progress: improving  Taken 2/13/2020 0835  Plan of Care Reviewed With: patient  Outcome Summary: Pt able to amb 600' with RW with step through gait mechanics SBA. Pt limited by fatigue. Pt able to ascend/descend 1 step with RW SBA. Pt is cond indep with all bed mobility. Educated patient on HEP and posterior hip precautions. Recommend patient home with assist and home health PT. Pt's niece is staying with patient for 3 days at discharge. PADD: 13

## 2020-02-13 NOTE — PLAN OF CARE
Problem: Patient Care Overview  Goal: Plan of Care Review  2/13/2020 1327 by Gerri Vasquez, OT  Flowsheets  Taken 2/13/2020 1050  Plan of Care Reviewed With: patient  Taken 2/13/2020 1327  Outcome Summary: OT educated pt on LLE PHP and incorporation into ADL routine. Pt has self-care kit and OT reviewed use of devices. He completed bed mobility with supervision and transfer training with supervision. He completed LB dressing with min assist. Recommend DC home with HHOT and family assist. CM notified of need for HHOT.

## 2020-02-13 NOTE — THERAPY DISCHARGE NOTE
"Patient Name: Kameron Khan  : 1965    MRN: [id number]                              Today's Date: 2020       Admit Date: 2020    Visit Dx:     ICD-10-CM ICD-9-CM   1. Status post total replacement of left hip Z96.642 V43.64   2. Primary osteoarthritis of left hip M16.12 715.15     Patient Active Problem List   Diagnosis   • Diverticulitis of sigmoid colon   • Leukocytosis   • Hyperlipidemia   • Elevated liver enzymes   • Tobacco use   • Arthritis   • Hyperglycemia   • Alcohol use   • Primary osteoarthritis of left hip   • Status post total replacement of left hip     Past Medical History:   Diagnosis Date   • Alcohol use 2017   • Allergic    • Arthritis 2017   • Diverticulitis of sigmoid colon 2017   • Elevated liver enzymes 2017   • History of transfusion     s/p hiatal hernia surgery- no reaction to blood   • Hyperglycemia 2017   • Hyperlipidemia    • IGT (impaired glucose tolerance) 2017   • Leukocytosis 2017   • Wears glasses     Readers      Past Surgical History:   Procedure Laterality Date   • ABDOMINAL SURGERY      pt reports hx years ago a surgery for hiatal hernia with esophageal reconstruction, \"I can't throw up\"   • COLONOSCOPY     • ENDOSCOPY     • FOOT SURGERY Right     Chilectomy and osteotomy- [place]   • HERNIA REPAIR     • KNEE SURGERY Left     Medial meniscus sx   • TOTAL HIP ARTHROPLASTY Left 2020    Procedure: TOTAL HIP ARTHROPLASTY LEFT;  Surgeon: Dom Terry MD;  Location: [facility];  Service: Orthopedics;  Laterality: Left;     General Information     Row Name 20 0835          PT Evaluation Time/Intention    Document Type  evaluation;discharge evaluation/summary  -CS     Mode of Treatment  physical therapy  -CS     Row Name 20 0835          General Information    Patient Profile Reviewed?  yes  -CS     Prior Level of Function  min assist:;dressing;bathing;home management;ADL's;gait Mobility limited " by L hip pain.  -CS     Existing Precautions/Restrictions  fall;left;hip, posterior  -CS     Barriers to Rehab  none identified  -CS     Row Name 02/13/20 0835          Relationship/Environment    Lives With  child(kane), dependent Pt lives with 7 year-old child but niece is coming to stay with patient for 3 days to assist and pt reports he has several friends and family that can assist at home as well.   -CS     Row Name 02/13/20 0835          Resource/Environmental Concerns    Current Living Arrangements  home/apartment/condo  -CS     Row Name 02/13/20 0835          Home Main Entrance    Number of Stairs, Main Entrance  one  -CS     Stair Railings, Main Entrance  none  -CS     Row Name 02/13/20 0835          Stairs Within Home, Primary    Stairs, Within Home, Primary  1 story house  -CS     Number of Stairs, Within Home, Primary  none  -CS     Row Name 02/13/20 0835          Cognitive Assessment/Intervention- PT/OT    Orientation Status (Cognition)  oriented x 4  -CS     Row Name 02/13/20 0835          Safety Issues, Functional Mobility    Impairments Affecting Function (Mobility)  endurance/activity tolerance;pain;range of motion (ROM);strength  -CS       User Key  (r) = Recorded By, (t) = Taken By, (c) = Cosigned By    Initials Name Provider Type    CS Sunita Lopez, PT Physical Therapist        Mobility     Row Name 02/13/20 0835          Bed Mobility Assessment/Treatment    Bed Mobility Assessment/Treatment  supine-sit  -CS     Supine-Sit [place] (Bed Mobility)  conditional independence;verbal cues  -CS     Assistive Device (Bed Mobility)  head of bed elevated  -CS     Comment (Bed Mobility)  VC's to move LE's to EOB. VC's to maintain posterior hip precautions with bed mobility.  -CS     Row Name 02/13/20 0835          Transfer Assessment/Treatment    Comment (Transfers)  VC's to push off of bed to stand and to reach back for chair to sit. VC's to step L LE out with sitting to maintain posterior hip  precautions.   -CS     Row Name 02/13/20 0835          Sit-Stand Transfer    Sit-Stand [place] (Transfers)  supervision;verbal cues  -CS     Assistive Device (Sit-Stand Transfers)  walker, front-wheeled  -CS     Row Name 02/13/20 0835          Gait/Stairs Assessment/Training    30622 - Gait Training Minutes   15  -CS     Gait/Stairs Assessment/Training  gait/ambulation independence;gait/ambulation assistive device;distance ambulated;gait pattern  -CS     [place] Level (Gait)  stand by assist;verbal cues  -CS     Assistive Device (Gait)  walker, front-wheeled  -CS     Distance in Feet (Gait)  600'  -CS     Pattern (Gait)  step-through  -CS     Deviations/Abnormal Patterns (Gait)  bilateral deviations;david decreased;gait speed decreased;stride length decreased  -CS     Negotiation (Stairs)  stairs independence;stairs assistive device;handrail location;number of steps  -CS     [place] Level (Stairs)  stand by assist  -CS     Assistive Device (Stairs)  walker, front-wheeled  -CS     Handrail Location (Stairs)  none  -CS     Number of Steps (Stairs)  1  -CS     Ascending Technique (Stairs)  step-to-step  -CS     Descending Technique (Stairs)  step-to-step  -CS     Comment (Gait/Stairs)  Pt able to amb 600' with RW SBA with step through gait mechanics. Pt limited by fatigue. VC's to increase heel strike on L LE, increase stance time on L LE, and to maintain upright posture. Pt able to correct with VC's. Pt able to ascend/descend 1 step with RW SBA with step to mechanics with VC's on sequencing.   -     Row Name 02/13/20 0835          Mobility Assessment/Intervention    Extremity Weight-bearing Status  left lower extremity  -CS     Left Lower Extremity (Weight-bearing Status)  weight-bearing as tolerated (WBAT)  -       User Key  (r) = Recorded By, (t) = Taken By, (c) = Cosigned By    Initials Name Provider Type    Sunita Barens, PT Physical Therapist        Obj/Interventions     Row Name  02/13/20 0905          General ROM    GENERAL ROM COMMENTS  LE R ROM WFL. L hip ROM limited by 25%.  -     Row Name 02/13/20 0905          MMT (Manual Muscle Testing)    General MMT Comments  L MMT grossly 4-/5. Pt able to indep perform SLR on L LE. R LE MMT WFL.   -     Row Name 02/13/20 0905          Therapeutic Exercise    Lower Extremity (Therapeutic Exercise)  heel slides, left;LAQ (long arc quad), left;quad sets, left;SLR (straight leg raise), left  -CS     Lower Extremity Range of Motion (Therapeutic Exercise)  ankle dorsiflexion/plantar flexion, bilateral;hip abduction/adduction, left  -CS     Exercise Type (Therapeutic Exercise)  isometric contraction, static;AROM (active range of motion);AAROM (active assistive range of motion);isotonic contraction, concentric  -CS     Position (Therapeutic Exercise)  seated  -CS     Sets/Reps (Therapeutic Exercise)  15 reps each   -CS     Comment (Therapeutic Exercise)  VC's on technique. Pt able to actively dorsiflex.   -[facility] Name 02/13/20 0905          Sensory Assessment/Intervention    Sensory General Assessment  no sensation deficits identified  -       User Key  (r) = Recorded By, (t) = Taken By, (c) = Cosigned By    Initials Name Provider Type    Sunita Barnes, PT Physical Therapist        Goals/Plan    No documentation.       Clinical Impression     [facility] Name 02/13/20 0835          Pain Assessment    Additional Documentation  Pain Scale: Numbers Pre/Post-Treatment (Group)  -CS     Row Name 02/13/20 0835          Pain Scale: Numbers Pre/Post-Treatment    Pain Scale: Numbers, Pretreatment  2/10  -CS     Pain Scale: Numbers, Post-Treatment  4/10  -CS     Pain Location - Side  Left  -CS     Pain Location - Orientation  incisional  -CS     Pain Location  hip  -CS     Pain Intervention(s)  Repositioned;Ambulation/increased activity;Cold applied  -     Row Name 02/13/20 0853          Plan of Care Review    Plan of Care Reviewed With  patient  -CS      Progress  improving  -CS     Outcome Summary  Pt able to amb 600' with RW with step through gait mechanics SBA. Pt limited by fatigue. Pt able to ascend/descend 1 step with RW SBA. Pt is cond indep with all bed mobility. Educated patient on HEP and posterior hip precautions. Recommend patient home with assist and home health PT. Pt's niece is staying with patient for 3 days at discharge. PADD: 13  -CS     Row Name 02/13/20 0835          Physical Therapy Clinical Impression    Patient/Family Goals Statement (PT Clinical Impression)  To go home.  -CS     Criteria for Skilled Interventions Met (PT Clinical Impression)  other (see comments) Pt is being discharged today.   -CS     Row Name 02/13/20 0835          Positioning and Restraints    Pre-Treatment Position  in bed  -CS     Post Treatment Position  chair  -CS     In Chair  notified nsg;reclined;call light within reach;encouraged to call for assist;exit alarm on;legs elevated  -CS       User Key  (r) = Recorded By, (t) = Taken By, (c) = Cosigned By    Initials Name Provider Type    Sunita Barnes PT Physical Therapist        Outcome Measures     Row Name 02/13/20 0835          How much help from another person do you currently need...    Turning from your back to your side while in flat bed without using bedrails?  4  -CS     Moving from lying on back to sitting on the side of a flat bed without bedrails?  4  -CS     Moving to and from a bed to a chair (including a wheelchair)?  4  -CS     Standing up from a chair using your arms (e.g., wheelchair, bedside chair)?  4  -CS     Climbing 3-5 steps with a railing?  4  -CS     To walk in hospital room?  4  -CS     AM-PAC 6 Clicks Score (PT)  24  -CS     Row Name 02/13/20 0835          Functional Assessment    Outcome Measure Options  AM-PAC 6 Clicks Basic Mobility (PT)  -CS       User Key  (r) = Recorded By, (t) = Taken By, (c) = Cosigned By    Initials Name Provider Type    Sunita Barnes PT Physical  Therapist          PT Recommendation and Plan     Outcome Summary/Treatment Plan (PT)  Anticipated Equipment Needs at Discharge (PT): other (see comments)(none)  Anticipated Discharge Disposition (PT): home with assist, home with home health  Plan of Care Reviewed With: patient  Progress: improving  Outcome Summary: Pt able to amb 600' with RW with step through gait mechanics SBA. Pt limited by fatigue. Pt able to ascend/descend 1 step with RW SBA. Pt is cond indep with all bed mobility. Educated patient on HEP and posterior hip precautions. Recommend patient home with assist and home health PT. Pt's niece is staying with patient for 3 days at discharge. PADD: 13     Time Calculation:   PT Charges     Row Name 02/13/20 0835             Time Calculation    Start Time  0835  -CS      PT Received On  02/13/20  -CS         Time Calculation- PT    Total Timed Code Minutes- PT  30 minute(s)  -CS         Timed Charges    63823 - PT Therapeutic Exercise Minutes  15  -CS      34841 - Gait Training Minutes   15  -CS        User Key  (r) = Recorded By, (t) = Taken By, (c) = Cosigned By    Initials Name Provider Type    CS Sunita Lopez, PT Physical Therapist        Therapy Charges for Today     Code Description Service Date Service Provider Modifiers Qty    76140914907 HC PT THER PROC EA 15 MIN 2/13/2020 Sunita Lopez, PT GP 1    30508743683 HC GAIT TRAINING EA 15 MIN 2/13/2020 Sunita Lopez, PT GP 1    09961580279 HC PT EVAL MOD COMPLEXITY 3 2/13/2020 Sunita Lopez, PT GP 1          PT G-Codes  Outcome Measure Options: AM-PAC 6 Clicks Basic Mobility (PT)  AM-PAC 6 Clicks Score (PT): 24    PT Discharge Summary  Anticipated Discharge Disposition (PT): home with assist, home with home health    Sunita Lopez PT  2/13/2020

## 2020-02-13 NOTE — DISCHARGE SUMMARY
Patient Name: Kameron Khan  MRN: [id number]  : 1965  DOS: 2020    Attending: Dom Terry MD    Primary Care Provider: KARRIE Perez MD    Date of Admission:.2020 10:08 AM    Date of Discharge:  2020    Discharge Diagnosis:     Status post total replacement of left hip    Hyperlipidemia    Primary osteoarthritis of left hip      Hospital Course  At admit :  Patient is a 54 y.o. male presented for scheduled surgery by Dr. Terry.  He anticipates a left total hip replacement today.  His hip has been severely painful for the last 6 months.  He denies the use of assistive device for ambulation or recent falls.     Seen preop he is doing well.  He denies pain or other complaints.  He denies nausea, shortness of breath or chest pain.  No history of DVT or PE.     After admit:  The patient has done well postop. The patient has been able to ambulate 600 feet with PT. he was seen by occupational therapy and has done well.    The patient has had good pain control with PO pain medications.    Adjustments were made to pain medications to optimize postop pain management. Risks and benefits of opiate medications discussed with patient.    The patient was placed on DVT prophylaxis including aspirin. The patient was encouraged to use IS for atelectasis prophylaxis.     The patient was placed on a bowel regimen to prevent constipation while on pain medication.   The patient's H/H was monitored with a slight decrease that remained asymptomatic.    It is felt by all involved that the patient can discharge home at this time, and the patient has no further questions    Procedures Performed  Procedure(s):  TOTAL HIP ARTHROPLASTY LEFT       Pertinent Test Results:    I reviewed the patient's new clinical results.   Results from last 7 days   Lab Units 20  0640   WBC 10*3/mm3 11.28*   HEMOGLOBIN g/dL 13.0   HEMATOCRIT % 38.7   PLATELETS 10*3/mm3 256     Results from last 7 days   Lab  "Units 20  0640   SODIUM mmol/L 137   POTASSIUM mmol/L 4.5   CHLORIDE mmol/L 101   CO2 mmol/L 25.0   BUN mg/dL 16   CREATININE mg/dL 0.66*   CALCIUM mg/dL 8.5*   GLUCOSE mg/dL 119*     I reviewed the patient's new imaging including images and reports.    Physical therapy  Outcome Summary: Pt able to amb 600' with RW with step through gait mechanics SBA. Pt limited by fatigue. Pt able to ascend/descend 1 step with RW SBA. Pt is cond indep with all bed mobility. Educated patient on HEP and posterior hip precautions. Recommend patient home with assist and home health PT. Pt's niece is staying with patient for 3 days at discharge. PADD: 13                     Discharge Assessment:      Visit Vitals  /68 (BP Location: Left arm, Patient Position: Lying)   Pulse 97   Temp 98.5 °F (36.9 °C) (Oral)   Resp 16   Ht 180.3 cm (71\")   Wt 91.6 kg (201 lb 15.1 oz)   SpO2 94%   BMI 28.17 kg/m²     Temp (24hrs), Av.9 °F (36.6 °C), Min:97.6 °F (36.4 °C), Max:98.5 °F (36.9 °C)      General Appearance:    Alert, cooperative, in no acute distress   Lungs:     Clear to auscultation,respirations regular, even and                   unlabored    Heart:    Regular rhythm and normal rate, normal S1 and S2    Abdomen:     Normal bowel sounds, no masses, no organomegaly, soft        non-tender, non-distended, no guarding, no rebound                 tenderness   Extremities:   Moves all extremities well, no edema, no cyanosis, no              Redness  Dry and intact Aquacel dressing left hip.   Pulses:   Pulses palpable and equal bilaterally   Skin:   No bleeding, bruising or rash   Neurologic:   Cranial nerves 2 - 12 grossly intact, sensation intact, intact flexion dorsiflexion bilateral feet.       Discharge Disposition: Home with home health PT.    Discharge Medications     Discharge Medications      New Medications      Instructions Start Date   aspirin 325 MG EC tablet   325 mg, Oral, Every 12 Hours Scheduled      docusate " sodium 100 MG capsule   100 mg, Oral, 2 Times Daily PRN, May take alternative stool softener you have at home.      oxyCODONE 5 MG immediate release tablet  Commonly known as:  ROXICODONE   5 mg, Oral, Every 4 Hours PRN         Continue These Medications      Instructions Start Date   acetaminophen 500 MG tablet  Commonly known as:  TYLENOL   1,000 mg, Oral, As Needed      amitriptyline 10 MG tablet  Commonly known as:  ELAVIL   12.5 mg, Oral, Nightly PRN      finasteride 5 MG tablet  Commonly known as:  PROSCAR   Oral, Daily, 1/3 of tablet      Loratadine 10 MG capsule   10 mg, Oral, Daily PRN      MULTIVITAMIN ADULT PO   1 tablet, Oral, Daily      naproxen 500 MG tablet  Commonly known as:  NAPROSYN   500 mg, Oral, 2 Times Daily With Meals      pravastatin 40 MG tablet  Commonly known as:  PRAVACHOL   40 mg, Oral, Daily      psyllium 58.6 % packet  Commonly known as:  METAMUCIL   1 packet, Oral, Daily         Stop These Medications    OSTEO BI-FLEX JOINT SHIELD tablet            Discharge Diet: Regular    Activity at Discharge:  Protected weight bearing as tolerated left lower extremity, posterior hip precautions x6 weeks    Follow-up Appointments  Dr. Terry per his orders.    Discussed with patient in detail, all questions were answered.    Dona Page MD  02/13/20  9:53 AM

## 2020-02-13 NOTE — PROGRESS NOTES
Discharge Planning Assessment  [facility]     Patient Name: Kameron Khan  MRN: [id number]  Today's Date: 2/13/2020    Admit Date: 2/12/2020    Discharge Needs Assessment     Row Name 02/13/20 0822       Living Environment    Lives With  child(kane), adult    Name(s) of Who Lives With Patient  6 yo son    Current Living Arrangements  home/apartment/condo    Primary Care Provided by  self    Provides Primary Care For  no one    Family Caregiver if Needed  sibling(s)    Family Caregiver Names  Paul Dowell (brother) [phone]    Able to Return to Prior Arrangements  yes       Resource/Environmental Concerns    Resource/Environmental Concerns  none    Transportation Concerns  car, none       Transition Planning    Patient/Family Anticipates Transition to  home    Patient/Family Anticipated Services at Transition  none    Transportation Anticipated  family or friend will provide       Discharge Needs Assessment    Readmission Within the Last 30 Days  no previous admission in last 30 days    Concerns to be Addressed  denies needs/concerns at this time    Equipment Currently Used at Home  walker, rolling;shower chair;grab bar    Anticipated Changes Related to Illness  none    Equipment Needed After Discharge  none    Outpatient/Agency/Support Group Needs  homecare agency    Discharge Facility/Level of Care Needs  home with home health    Provided post acute provider list?  Yes    Post Acute Provider List  Home Health    Post Acute Provider Quality & Resource List  Yes    Delivered To  Patient    Method of Delivery  In person    Patient's Choice of Community Agency(s)  [facility]        Discharge Plan     Row Name 02/13/20 0824       Plan    Plan  Home with [facility]    Patient/Family in Agreement with Plan  yes    Plan Comments  Spoke with patient at bedside. Lives with 7 year old son in [place]. Caregiver is Paul Dowell (brother) [phone]. Is independent with ADL's. Has a rolling walker  at home. Plan is home with family. CM will continue to follow    Final Discharge Disposition Code  06 - home with home health care        Destination      Coordination has not been started for this encounter.      Durable Medical Equipment      Coordination has not been started for this encounter.      Dialysis/Infusion      Coordination has not been started for this encounter.      Home Medical Care      Coordination has not been started for this encounter.      Therapy      Coordination has not been started for this encounter.      Community Resources      Coordination has not been started for this encounter.          Demographic Summary     Row Name 02/13/20 0822       General Information    Admission Type  inpatient    Arrived From  PACU/recovery room    Referral Source  admission list    Reason for Consult  discharge planning    Preferred Language  English     Used During This Interaction  no       Contact Information    Permission Granted to Share Info With      Contact Information Obtained for      Contact Information Comments  PCP is Bakari Perez MD        Functional Status     Row Name 02/13/20 0822       Functional Status    Usual Activity Tolerance  good    Current Activity Tolerance  good       Functional Status, IADL    Medications  independent    Meal Preparation  independent    Housekeeping  independent    Laundry  independent    Shopping  independent       Mental Status    General Appearance WDL  WDL       Mental Status Summary    Recent Changes in Mental Status/Cognitive Functioning  no changes       Employment/    Employment Status  self-employed        Psychosocial    No documentation.       Abuse/Neglect    No documentation.       Legal    No documentation.       Substance Abuse    No documentation.       Patient Forms    No documentation.           Kale Sanchez RN

## 2020-02-13 NOTE — PAYOR COMM NOTE
"Kameron Khan (54 y.o. Male)         Date of Birth Social Security Number Address Home Phone MRN    1965  [address] [address]  [facility] [address] [phone] [id number]    Religious Marital Status          Faith Single       Admission Date Admission Type Admitting Provider Attending Provider Department, Room/Bed    20 Elective Dom Terry MD Luckett, Matthew R, MD [facility] 3G, S354/1    Discharge Date Discharge Disposition Discharge Destination         Home or Self Care              Attending Provider:  Dom Terry MD    Allergies:  No Known Allergies    Isolation:  None   Infection:  None   Code Status:  CPR    Ht:  180.3 cm (71\")   Wt:  91.6 kg (201 lb 15.1 oz)    Admission Cmt:  None   Principal Problem:  Status post total replacement of left hip [Z96.642]                 Active Insurance as of 2020     Primary Coverage     Payor Plan Insurance Group Employer/Plan Group    [id number] [id number]     Payor Plan Address Payor Plan Phone Number Payor Plan Fax Number Effective Dates    PO    2019 - None Entered    [facility] [id number]       Subscriber Name Subscriber Birth Date Member ID       KAMERON KHAN 1965 [id number]                 Emergency Contacts      (Rel.) Home Phone Work Phone Mobile Phone    Paul Dowell (Brother) [phone] -- [phone]    anshu dowell (Other) -- -- [phone]        Patient Name: Kameron Khan  MRN: [id number]  : 1965  DOS: 2020     Attending: Dom Terry MD     Primary Care Provider: KARRIE Perez MD     Date of Admission:.2020 10:08 AM     Date of Discharge:  2020     Discharge Diagnosis:   Status post total replacement of left hip    Hyperlipidemia    Primary osteoarthritis of left hip        Hospital Course  Patient is a 54 y.o. male presented with .       Procedures Performed  Procedure(s):  TOTAL HIP " "ARTHROPLASTY LEFT     Pertinent Test Results:               I reviewed the patient's new clinical results.        Results from last 7 days   Lab Units 20  0640   WBC 10*3/mm3 11.28*   HEMOGLOBIN g/dL 13.0   HEMATOCRIT % 38.7   PLATELETS 10*3/mm3 256           Results from last 7 days   Lab Units 20  0640   SODIUM mmol/L 137   POTASSIUM mmol/L 4.5   CHLORIDE mmol/L 101   CO2 mmol/L 25.0   BUN mg/dL 16   CREATININE mg/dL 0.66*   CALCIUM mg/dL 8.5*   GLUCOSE mg/dL 119*      I reviewed the patient's new imaging including images and reports.              Physical therapy     Discharge Assessment:    Vital Signs  Visit Vitals  /68 (BP Location: Left arm, Patient Position: Lying)   Pulse 97   Temp 98.5 °F (36.9 °C) (Oral)   Resp 16   Ht 180.3 cm (71\")   Wt 91.6 kg (201 lb 15.1 oz)   SpO2 94%   BMI 28.17 kg/m²      Temp (24hrs), Av.9 °F (36.6 °C), Min:97.6 °F (36.4 °C), Max:98.5 °F (36.9 °C)        General Appearance:    Alert, cooperative, in no acute distress   Lungs:     Clear to auscultation,respirations regular, even and                   unlabored    Heart:    Regular rhythm and normal rate, normal S1 and S2, no            murmur, no gallop, no rub, no click   Abdomen:     Normal bowel sounds, no masses, no organomegaly, soft        non-tender, non-distended, no guarding, no rebound                 tenderness   Extremities:   Moves all extremities well, no edema, no cyanosis, no              redness   Pulses:   Pulses palpable and equal bilaterally   Skin:   No bleeding, bruising or rash   Neurologic:   Cranial nerves 2 - 12 grossly intact, sensation intact, DTR        present and equal bilaterally         Discharge Disposition:     Discharge Medications           Discharge Medications            New Medications      Instructions Start Date   aspirin 325 MG EC tablet    325 mg, Oral, Every 12 Hours Scheduled        docusate sodium 100 MG capsule    100 mg, Oral, 2 Times Daily PRN, May take " alternative stool softener you have at home.        oxyCODONE 5 MG immediate release tablet  Commonly known as:  ROXICODONE    5 mg, Oral, Every 4 Hours PRN                      Continue These Medications      Instructions Start Date   acetaminophen 500 MG tablet  Commonly known as:  TYLENOL    1,000 mg, Oral, As Needed        amitriptyline 10 MG tablet  Commonly known as:  ELAVIL    12.5 mg, Oral, Nightly PRN        finasteride 5 MG tablet  Commonly known as:  PROSCAR    Oral, Daily, 1/3 of tablet        Loratadine 10 MG capsule    10 mg, Oral, Daily PRN        MULTIVITAMIN ADULT PO    1 tablet, Oral, Daily        naproxen 500 MG tablet  Commonly known as:  NAPROSYN    500 mg, Oral, 2 Times Daily With Meals        pravastatin 40 MG tablet  Commonly known as:  PRAVACHOL    40 mg, Oral, Daily        psyllium 58.6 % packet  Commonly known as:  METAMUCIL    1 packet, Oral, Daily            Stop These Medications    OSTEO BI-FLEX JOINT SHIELD tablet                Discharge Diet:      Activity at Discharge:      Follow-up Appointments  Next follow-up appointment           Dona Page MD  02/13/20  9:53 AM                              Insurance Information                [employer] Phone:     Subscriber: Kameron Larsen Subscriber#: [id number]    Group#: [id number] Precert#:           Orders (last 24 hrs)      Start     Ordered    02/13/20 1600  Vital Signs  Every 8 Hours      02/12/20 1746 02/13/20 1600  Neurovascular Checks  Every 8 Hours      02/12/20 1746 02/13/20 0951  Discharge patient  Once     Comments:  Dc home after cleared by PT/OT.  F/u  per his orders.  6 weeks posterior hip precautions.    02/13/20 0952    02/13/20 0900  meloxicam (MOBIC) tablet 15 mg  Daily      02/12/20 1746 02/13/20 0900  aspirin EC tablet 325 mg  Every 12 Hours Scheduled      02/12/20 1746 02/13/20 0800  Up in Chair 3x / Day - Beginning POD 1  3 Times Daily      02/12/20 1746     02/13/20 0800  Ambulate in Gomez 4x / Day Beginning POD 1  4 Times Daily      02/12/20 1746    02/13/20 0600  CBC (No Diff)  Morning Draw      02/12/20 1746    02/13/20 0600  Basic Metabolic Panel  Morning Draw,   Status:  Canceled      02/12/20 1746    02/13/20 0600  Basic Metabolic Panel  Daily      02/12/20 1746    02/13/20 0600  Hemoglobin & Hematocrit, Blood  Daily      02/12/20 1746    02/13/20 0000  aspirin 325 MG EC tablet  Every 12 Hours Scheduled      02/13/20 0952    02/13/20 0000  docusate sodium 100 MG capsule  2 Times Daily PRN      02/13/20 0952    02/13/20 0000  oxyCODONE (ROXICODONE) 5 MG immediate release tablet  Every 4 Hours PRN      02/13/20 0952    02/13/20 0000  Ambulatory Referral to Home Health      02/13/20 1200    02/13/20 0000  Walker      02/13/20 1318    02/12/20 2200  ceFAZolin in dextrose (ANCEF) IVPB solution 2 g  Every 8 Hours      02/12/20 1746    02/12/20 2100  pravastatin (PRAVACHOL) tablet 40 mg  Nightly      02/12/20 1421    02/12/20 2000  Vital Signs  Every 4 Hours      02/12/20 1746 02/12/20 2000  Neurovascular Checks  Every 4 Hours      02/12/20 1746    02/12/20 1845  sodium chloride 0.9 % infusion  Continuous      02/12/20 1746    02/12/20 1832  May use home Pravastatin  Nursing Communication  Once     Comments:  May use home Pravastatin    02/12/20 1832    02/12/20 1800  psyllium (METAMUCIL MULTIHEALTH FIBER) 58.12 % packet 1 packet  Daily      02/12/20 1421    02/12/20 1800  Turn, Cough & Deep Breathe Every 2 Hours Until Ambulating  Every 2 Hours      02/12/20 1746 02/12/20 1800  Incentive Spirometry  Every 2 Hours While Awake     Comments:  Until lungs are clear and no productive cough.    02/12/20 1746    02/12/20 1800  acetaminophen (TYLENOL) tablet 1,000 mg  Every 6 Hours Scheduled      02/12/20 1746    02/12/20 1752  Diet Regular  Diet Effective Now      02/12/20 1752    02/12/20 1746  ondansetron (ZOFRAN) injection 4 mg  Every 6 Hours PRN      02/12/20 1741     02/12/20 1746  sodium chloride 0.9 % bolus 500 mL  3 Times Daily PRN      02/12/20 1746    02/12/20 1746  labetalol (NORMODYNE,TRANDATE) injection 10 mg  Every 4 Hours PRN      02/12/20 1746    02/12/20 1746  Code Status and Medical Interventions:  Continuous      02/12/20 1746    02/12/20 1746  Weight Bearing - As Tolerated  Continuous     Comments:  Protected with walker at all times.    02/12/20 1746    02/12/20 1746  Up in Chair x 1 Day of Surgery  Once      02/12/20 1746    02/12/20 1746  Same Day Discharge Patients - Up in Chair x2 on Day of Surgery  Every Shift      02/12/20 1746    02/12/20 1746  Ambulate in Gomez x1 Day of Surgery  Once      02/12/20 1746    02/12/20 1746  Pillows May Be Used to Elevate Operative Leg  Continuous      02/12/20 1746    02/12/20 1746  Instruct Patient to Do At Least 10 Ankle Pumps Per Hour While Awake  Once     Comments:  Instruct Patient to Do At Least 10 Ankle Pumps Per Hour While Awake    02/12/20 1746    02/12/20 1746  May Stand to Void or Use Bedside Commode Day of Surgery. POD 1 & Thereafter Use Bedside Commode or Bathroom  Once      02/12/20 1746    02/12/20 1746  Intake and Output  Every Shift      02/12/20 1746    02/12/20 1746  Saline Lock IV With Adequate PO Intake  Continuous      02/12/20 1746    02/12/20 1746  MD to Change Dressing  Every Shift      02/12/20 1746    02/12/20 1746  Oxygen Therapy-  Continuous      02/12/20 1746    02/12/20 1746  Diet Clear Liquid  Diet Effective Now,   Status:  Canceled      02/12/20 1746    02/12/20 1746  Advance diet as tolerated  Until Discontinued      02/12/20 1746    02/12/20 1746  Inpatient Consult to Hospitalist  Once     Specialty:  Hospitalist  Provider:  Dona Page MD    02/12/20 1746    02/12/20 1746  Inpatient Consult to Case Management   Once     Provider:  (Not yet assigned)    02/12/20 1746    02/12/20 1746  HYDROmorphone (DILAUDID) injection 0.5 mg  Every 2 Hours PRN      02/12/20 4581     02/12/20 1746  naloxone (NARCAN) injection 0.1 mg  Every 5 Minutes PRN      02/12/20 1746    02/12/20 1746  ondansetron (ZOFRAN) tablet 4 mg  Every 6 Hours PRN      02/12/20 1746    02/12/20 1746  ondansetron (ZOFRAN) injection 4 mg  Every 6 Hours PRN      02/12/20 1746    02/12/20 1745  oxyCODONE (ROXICODONE) immediate release tablet 5 mg  Every 4 Hours PRN      02/12/20 1746    02/12/20 1745  oxyCODONE (ROXICODONE) immediate release tablet 10 mg  Every 4 Hours PRN      02/12/20 1746    02/12/20 1745  docusate sodium (COLACE) capsule 100 mg  2 Times Daily PRN      02/12/20 1746    02/12/20 1745  bisacodyl (DULCOLAX) EC tablet 10 mg  Daily PRN      02/12/20 1746    02/12/20 1745  bisacodyl (DULCOLAX) suppository 10 mg  Daily PRN      02/12/20 1746    02/12/20 1743  DIET MESSAGE Please send patient salad with chicken and blue cheese dressing. Also send fruit cup and dessert also. Thank you.  Once     Comments:  Please send patient salad with chicken and blue cheese dressing. Also send fruit cup and dessert also. Thank you.    02/12/20 1745    02/12/20 1627  Notify Provider  Until Discontinued      02/12/20 1626    02/12/20 1627  PT Consult: Eval & Treat  Once     Comments:  Protected with walker at all times.    02/12/20 1626    02/12/20 1627  OT Consult: Eval & Treat  Once      02/12/20 1626    02/12/20 1627  XR Pelvis 1 or 2 View  1 Time Imaging     Comments:  AP pelvis (ortho pelvis) only.    02/12/20 1626    02/12/20 1627  Place Sequential Compression Device  Once      02/12/20 1626    02/12/20 1627  Maintain Sequential Compression Device  Continuous      02/12/20 1626    02/12/20 1610  lactated ringers infusion  Continuous      02/12/20 1608    02/12/20 1609  Vital signs every 5 minutes for 15 minutes, every 15 minutes thereafter.  Once,   Status:  Canceled      02/12/20 1608    02/12/20 1609  Call Anesthesiologist for additional IV Fluid bolus for Hypotension/Tachycardia  Until Discontinued,   Status:   Canceled      02/12/20 1608    02/12/20 1609  Notify Anesthesia of Any Acute Changes in Patient Condition  Until Discontinued,   Status:  Canceled      02/12/20 1608    02/12/20 1609  Notify Anesthesia for Unrelieved Pain  Until Discontinued,   Status:  Canceled      02/12/20 1608    02/12/20 1609  Oxygen Therapy- Blow by - Humidified; Titrate for SPO2: equal to or greater than, 96%, per policy  Continuous,   Status:  Canceled      02/12/20 1608    02/12/20 1609  Pulse Oximetry, Continuous  Continuous      02/12/20 1608    02/12/20 1609  Once DC criteria to floor met, follow surgeon's orders.  Until Discontinued,   Status:  Canceled      02/12/20 1608    02/12/20 1609  Discharge patient from PACU when discharge criteria is met.  Until Discontinued,   Status:  Canceled      02/12/20 1608    02/12/20 1608  fentaNYL citrate (PF) (SUBLIMAZE) injection 50 mcg  Every 10 Minutes PRN,   Status:  Discontinued      02/12/20 1608    02/12/20 1608  HYDROmorphone (DILAUDID) injection 0.5 mg  Every 5 Minutes PRN,   Status:  Discontinued      02/12/20 1608    02/12/20 1608  labetalol (NORMODYNE,TRANDATE) injection 5 mg  Every 5 Minutes PRN,   Status:  Discontinued      02/12/20 1608    02/12/20 1608  naloxone (NARCAN) injection 0.4 mg  As Needed,   Status:  Discontinued      02/12/20 1608    02/12/20 1608  ondansetron (ZOFRAN) injection 4 mg  Once As Needed,   Status:  Discontinued      02/12/20 1608    02/12/20 1608  meperidine (DEMEROL) injection 12.5 mg  Every 5 Minutes PRN,   Status:  Discontinued      02/12/20 1608    02/12/20 1608  POC Glucose PRN  As Needed,   Status:  Canceled     Comments:  PRN if diabetic. Notify MD on call if <60 or >250      02/12/20 1608    02/12/20 1608  lactated ringers bolus 250 mL  Once As Needed,   Status:  Discontinued      02/12/20 1608    02/12/20 1608  ePHEDrine injection 5 mg  Once As Needed,   Status:  Discontinued      02/12/20 1608    02/12/20 1459  sodium chloride (NS) irrigation  solution  As Needed,   Status:  Discontinued      02/12/20 1459    02/12/20 1455  sodium chloride 20 mL, lidocaine-EPINEPHrine 20 mL, ropivacaine 0.5 % 20 mL, ketorolac 30 mg, Morphine sulfate (PF) 8 mL mixture  As Needed,   Status:  Discontinued      02/12/20 1459    02/12/20 1420  amitriptyline (ELAVIL) tablet 12.5 mg  Nightly PRN      02/12/20 1421    02/12/20 1027  sodium chloride 0.9 % flush 10 mL  Every 12 Hours Scheduled,   Status:  Discontinued      02/12/20 1025    02/12/20 1027  lactated ringers infusion  Continuous      02/12/20 1025    02/12/20 1025  Vital Signs - Per Anesthesia Protocol  As Needed,   Status:  Canceled      02/12/20 1025    02/12/20 1025  sodium chloride 0.9 % flush 10 mL  As Needed,   Status:  Discontinued      02/12/20 1025    02/12/20 0000  Discharge Follow-up with Specialty: [facility]; 3 Weeks      02/12/20 1611    Unscheduled  Ice to Incision for 48 Hours  As Needed      02/12/20 1746    Unscheduled  Apply Ice to Incision PRN for Edema, After Activity or Exercise, and to Lessen Discomfort  As Needed      02/12/20 1746    Unscheduled  Straight Cath for Urinary Retention  As Needed     Comments:  Q6H PRN    02/12/20 1746    Signed and Held  famotidine (PEPCID) injection 20 mg  Once,   Status:  Canceled      Signed and Held

## 2020-02-13 NOTE — PROGRESS NOTES
Case Management Discharge Note      Final Note: Plan is home with [facility]. [facility] does not take patient's insurance. Referral faxed to [email or web address].    Provided post acute provider list?: Yes  Post Acute Provider List: Home Health  Post Acute Provider Quality & Resource List: Yes  Delivered To: Patient  Method of Delivery: In person    Destination      No service has been selected for the patient.      Durable Medical Equipment      No service has been selected for the patient.      Dialysis/Infusion      No service has been selected for the patient.      Home Medical Care - Selection Complete      Service Provider Request Status Selected Services Address Phone Number Fax Number    [facility] Selected Home Health Services [address] [address] [phone]      Therapy      No service has been selected for the patient.      Community Resources      No service has been selected for the patient.             Final Discharge Disposition Code: 06 - home with home health care

## 2020-02-13 NOTE — PLAN OF CARE
Problem: Patient Care Overview  Goal: Plan of Care Review  Outcome: Ongoing (interventions implemented as appropriate)  Flowsheets (Taken 2/13/2020 0268)  Progress: improving  Plan of Care Reviewed With: patient  Note:   Patient c/o pain during the night PRN IV and PO medications given per orders. Patients dressing in place c/d/I and ice pack in place. Patient ambulated in hallway with x 1 assist walker and gait belt. Vitals signs stable will continue to monitor.

## 2020-03-03 ENCOUNTER — OFFICE VISIT (OUTPATIENT)
Dept: ORTHOPEDIC SURGERY | Facility: CLINIC | Age: 55
End: 2020-03-03

## 2020-03-03 DIAGNOSIS — Z96.642 STATUS POST TOTAL REPLACEMENT OF LEFT HIP: Primary | ICD-10-CM

## 2020-03-03 PROCEDURE — 99024 POSTOP FOLLOW-UP VISIT: CPT | Performed by: PHYSICIAN ASSISTANT

## 2020-03-03 NOTE — PROGRESS NOTES
[facility] Note    Subjective     Patient: Kameron Khan  : 1965    Primary Care Provider: KARRIE Perez MD    Requesting Provider: As above    Post-op of the Left Hip (3 weeks post Total Hip Arthroplasty Left 20)      History    History of Present Illness: Patient presents with 3 weeks status post left total hip arthroplasty with Dr. Terry on 2020.  He reports no pain in the hip.  He has been very happy with his outcome.  He is not using a cane or walker for ambulation.  He was discharged from home health yesterday.  No new symptoms.    Current Outpatient Medications on File Prior to Visit   Medication Sig Dispense Refill   • acetaminophen (TYLENOL) 500 MG tablet Take 1,000 mg by mouth As Needed for Mild Pain .     • amitriptyline (ELAVIL) 10 MG tablet Take 12.5 mg by mouth At Night As Needed for Sleep.     • aspirin 325 MG EC tablet Take 1 tablet by mouth Every 12 (Twelve) Hours. 60 tablet 0   • docusate sodium 100 MG capsule Take 100 mg by mouth 2 (Two) Times a Day As Needed for Constipation. May take alternative stool softener you have at home.     • finasteride (PROSCAR) 5 MG tablet Take  by mouth Daily. 1/3 of tablet     • Loratadine 10 MG capsule Take 10 mg by mouth Daily As Needed (allergies).     • Multiple Vitamins-Minerals (MULTIVITAMIN ADULT PO) Take 1 tablet by mouth Daily.     • naproxen (NAPROSYN) 500 MG tablet Take 500 mg by mouth 2 (Two) Times a Day With Meals.     • pravastatin (PRAVACHOL) 40 MG tablet Take 40 mg by mouth Daily.     • psyllium (METAMUCIL) 58.6 % packet Take 1 packet by mouth Daily.       No current facility-administered medications on file prior to visit.       No Known Allergies   Past Medical History:   Diagnosis Date   • Alcohol use 2017   • Allergic    • Arthritis 2017   • Diverticulitis of sigmoid colon 2017   • Elevated liver enzymes 2017   • History of transfusion     s/p hiatal hernia  "surgery- no reaction to blood   • Hyperglycemia 5/24/2017   • Hyperlipidemia    • IGT (impaired glucose tolerance) 5/24/2017   • Leukocytosis 05/24/2017   • Wears glasses     Readers      Past Surgical History:   Procedure Laterality Date   • ABDOMINAL SURGERY      pt reports hx years ago a surgery for hiatal hernia with esophageal reconstruction, \"I can't throw up\"   • COLONOSCOPY  2018   • ENDOSCOPY     • FOOT SURGERY Right     Chilectomy and osteotomy- [place]   • HERNIA REPAIR     • KNEE SURGERY Left     Medial meniscus sx   • TOTAL HIP ARTHROPLASTY Left 2/12/2020    Procedure: TOTAL HIP ARTHROPLASTY LEFT;  Surgeon: Dom Terry MD;  Location: [facility];  Service: Orthopedics;  Laterality: Left;     Family History   Problem Relation Age of Onset   • Heart failure Mother    • Diabetes Mother    • Stroke Mother    • Cancer Mother    • Osteoarthritis Mother    • Hypertension Father    • Heart failure Father       Social History     Socioeconomic History   • Marital status: Single     Spouse name: Not on file   • Number of children: Not on file   • Years of education: Not on file   • Highest education level: Not on file   Tobacco Use   • Smoking status: Light Tobacco Smoker     Types: Cigars   • Smokeless tobacco: Never Used   • Tobacco comment: cigar rarely; never smoked cigarettes    Substance and Sexual Activity   • Alcohol use: Yes     Comment: 1 -2 drinks daily on average   • Drug use: No   • Sexual activity: Defer   Social History Narrative    Mr. Shah is a 52 year old white  male who is currently going through a divorce with his wife. He has two children. 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 He does not have a living will or advanced directive.        Review of Systems    The following portions of the patient's history were reviewed and updated as appropriate: allergies, current medications, past family history, past medical history, past social history, past " surgical history and problem list.      Objective      Physical Exam  There were no vitals taken for this visit.    There is no height or weight on file to calculate BMI.    Ortho Exam  Left hip exam:  Posterior hip incision is healing well  No pain with hip rotation  Intact hip flexors, adductor's, abductor's  Neurovascular intact distally  Normal gait    Medical Decision Making    Data Review:   ordered and reviewed x-rays today    Assessment:  1. Status post total replacement of left hip        Plan:  Doing well status post left total hip arthroplasty with Dr. Terry on 2/12/2020.  X-rays today show well-positioned total hip arthroplasty with no evidence of ostial lysis, subsidence or fracture.  Patient was discharged from home health PT.  I explained the importance of outpatient physical therapy to work on continued strengthening and gait training with physical therapy.  Patient feels he is doing very well on his own and will continue his hip exercises given to him at home PT at the gym.  He is not planning on doing outpatient PT.  Plan today is he continue his routine and return to see Dr. Terry in 3 weeks or sooner if needed.      Tiffanie Graf PA-C  03/04/20  12:49 PM

## 2020-03-24 ENCOUNTER — OFFICE VISIT (OUTPATIENT)
Dept: ORTHOPEDIC SURGERY | Facility: CLINIC | Age: 55
End: 2020-03-24

## 2020-03-24 DIAGNOSIS — Z96.642 STATUS POST TOTAL REPLACEMENT OF LEFT HIP: Primary | ICD-10-CM

## 2020-03-24 PROCEDURE — 99024 POSTOP FOLLOW-UP VISIT: CPT | Performed by: ORTHOPAEDIC SURGERY

## 2020-03-24 NOTE — PROGRESS NOTES
"Orthopaedic Clinic Note:  Hip Post Op    Chief Complaint   Patient presents with   • Post-op     3 weeks follow up; 7 weeks status post Total Hip Arthroplasty Left 2/12/20      HPI    Mr. Khan is 7  week(s) s/p left total hip arthroplasty. Rates pain 1/10. He is ambulating with no assistive device and is taking nothing for pain control. He denies fevers, chills, or constitutional symptoms. He has completed outpatient PT. Patient is improving overall.  He denies complications.  He is happy with his outcome.    Past Medical History:   Diagnosis Date   • Alcohol use 5/24/2017   • Allergic    • Arthritis 5/24/2017   • Diverticulitis of sigmoid colon 05/24/2017   • Elevated liver enzymes 5/24/2017   • History of transfusion     s/p hiatal hernia surgery- no reaction to blood   • Hyperglycemia 5/24/2017   • Hyperlipidemia    • IGT (impaired glucose tolerance) 5/24/2017   • Leukocytosis 05/24/2017   • Wears glasses     Readers       Past Surgical History:   Procedure Laterality Date   • ABDOMINAL SURGERY      pt reports hx years ago a surgery for hiatal hernia with esophageal reconstruction, \"I can't throw up\"   • COLONOSCOPY  2018   • ENDOSCOPY     • FOOT SURGERY Right     Chilectomy and osteotomy- [facility]   • HERNIA REPAIR     • KNEE SURGERY Left     Medial meniscus sx   • TOTAL HIP ARTHROPLASTY Left 2/12/2020    Procedure: TOTAL HIP ARTHROPLASTY LEFT;  Surgeon: Dom Terry MD;  Location: [facility];  Service: Orthopedics;  Laterality: Left;      Family History   Problem Relation Age of Onset   • Heart failure Mother    • Diabetes Mother    • Stroke Mother    • Cancer Mother    • Osteoarthritis Mother    • Hypertension Father    • Heart failure Father      Social History     Socioeconomic History   • Marital status: Single     Spouse name: Not on file   • Number of children: Not on file   • Years of education: Not on file   • Highest education level: Not on file   Tobacco Use   • Smoking status: Light " Tobacco Smoker     Types: Cigars   • Smokeless tobacco: Never Used   • Tobacco comment: cigar rarely; never smoked cigarettes    Substance and Sexual Activity   • Alcohol use: Yes     Comment: 1 -2 drinks daily on average   • Drug use: No   • Sexual activity: Defer   Social History Narrative    Mr. Shah is a 52 year old white  male who is currently going through a divorce with his wife. He has two children. 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 He does not have a living will or advanced directive.      Current Outpatient Medications on File Prior to Visit   Medication Sig Dispense Refill   • acetaminophen (TYLENOL) 500 MG tablet Take 1,000 mg by mouth As Needed for Mild Pain .     • amitriptyline (ELAVIL) 10 MG tablet Take 12.5 mg by mouth At Night As Needed for Sleep.     • aspirin 325 MG EC tablet Take 1 tablet by mouth Every 12 (Twelve) Hours. 60 tablet 0   • docusate sodium 100 MG capsule Take 100 mg by mouth 2 (Two) Times a Day As Needed for Constipation. May take alternative stool softener you have at home.     • finasteride (PROSCAR) 5 MG tablet Take  by mouth Daily. 1/3 of tablet     • Loratadine 10 MG capsule Take 10 mg by mouth Daily As Needed (allergies).     • Multiple Vitamins-Minerals (MULTIVITAMIN ADULT PO) Take 1 tablet by mouth Daily.     • naproxen (NAPROSYN) 500 MG tablet Take 500 mg by mouth 2 (Two) Times a Day With Meals.     • pravastatin (PRAVACHOL) 40 MG tablet Take 40 mg by mouth Daily.     • psyllium (METAMUCIL) 58.6 % packet Take 1 packet by mouth Daily.       No current facility-administered medications on file prior to visit.       No Known Allergies     Review of Systems   Constitutional: Negative.    HENT: Negative.    Eyes: Negative.    Respiratory: Negative.    Cardiovascular: Negative.    Gastrointestinal: Negative.    Endocrine: Negative.    Genitourinary: Negative.    Musculoskeletal: Positive for arthralgias.   Skin: Negative.     Allergic/Immunologic: Negative.    Neurological: Negative.    Hematological: Negative.    Psychiatric/Behavioral: Negative.         Physical Exam  There were no vitals taken for this visit.    There is no height or weight on file to calculate BMI.    GENERAL APPEARANCE: awake, alert, oriented, in no acute distress and well developed, well nourished  LUNGS:  breathing nonlabored  EXTREMITIES: no clubbing, cyanosis  PERIPHERAL PULSES: palpable dorsalis pedis and posterior tibial pulses bilaterally.    GAIT:  Normal            Hip Exam:  Left    RANGE OF MOTION:  EXTENSION/FLEXION:  normal (0-110 degrees)  IR:  20  ER:  35  PAIN WITH HIP MOTION:  no  PAIN WITH LOGROLL:  no     STRENGTH:  ABDUCTOR:  5/5  ADDUCTOR:  5/5  HIP FLEXION:  5/5    GREATER TROCHANTER BURSAL PAIN:  no    SENSATION TO LIGHT TOUCH:  DEEP PERONEAL/SUPERFICIAL PERONEAL/SURAL/SAPHENOUS/TIBIAL:   intact    EDEMA:  no  ERYTHEMA:  no  WOUNDS/INCISIONS:   yes, well healed surgical incision without evidence of erythema or drainage  _______________________________________________________________  _______________________________________________________________    RADIOGRAPHIC FINDINGS:   Indication: Status post left total hip arthroplasty    Comparison: Todays xrays were compared to previous xrays from 3/3/2020    AP pelvis: Left: Demonstrate a well positioned total hip without evidence of wear, loosening, fracture or osteolysis, femoral head is concentrically reduced within the acetabulum and No significant changes compared to prior radiographs.    Assessment/Plan:   Diagnosis Plan   1. Status post total replacement of left hip  XR Pelvis 1 or 2 View     Patient is doing well 6 weeks status post left total hip arthroplasty.  I recommended continued hip strengthening and gait training to improve endurance.  I will see the patient back in 2 months for repeat assessment x-ray of the pelvis on return.    Dom Terry MD  03/24/20  14:02

## 2020-05-26 ENCOUNTER — OFFICE VISIT (OUTPATIENT)
Dept: ORTHOPEDIC SURGERY | Facility: CLINIC | Age: 55
End: 2020-05-26

## 2020-05-26 VITALS — WEIGHT: 202 LBS | BODY MASS INDEX: 28.28 KG/M2 | HEIGHT: 71 IN | HEART RATE: 100 BPM | OXYGEN SATURATION: 98 %

## 2020-05-26 DIAGNOSIS — Z96.642 STATUS POST TOTAL REPLACEMENT OF LEFT HIP: Primary | ICD-10-CM

## 2020-05-26 PROCEDURE — 99212 OFFICE O/P EST SF 10 MIN: CPT | Performed by: ORTHOPAEDIC SURGERY

## 2020-05-26 NOTE — PROGRESS NOTES
"Orthopaedic Clinic Note:  Hip Post Op    Chief Complaint   Patient presents with   • Post-op Follow-up     8 weeks follow up; 15 weeks status post Total Hip Arthroplasty Left 2/12/20        HPI    Mr. Khan is 15  week(s) s/p left total hip arthroplasty. Rates pain 0/10. He is ambulating with no assistive device and is taking nothing for pain control. He denies fevers, chills, or constitutional symptoms. He has completed postoperative rehab program. Patient is improving overall.  He denies any complications.  He is happy with his outcome.    Past Medical History:   Diagnosis Date   • Alcohol use 5/24/2017   • Allergic    • Arthritis 5/24/2017   • Diverticulitis of sigmoid colon 05/24/2017   • Elevated liver enzymes 5/24/2017   • History of transfusion     s/p hiatal hernia surgery- no reaction to blood   • Hyperglycemia 5/24/2017   • Hyperlipidemia    • IGT (impaired glucose tolerance) 5/24/2017   • Leukocytosis 05/24/2017   • Wears glasses     Readers       Past Surgical History:   Procedure Laterality Date   • ABDOMINAL SURGERY      pt reports hx years ago a surgery for hiatal hernia with esophageal reconstruction, \"I can't throw up\"   • COLONOSCOPY  2018   • ENDOSCOPY     • FOOT SURGERY Right     Chilectomy and osteotomy- [facility]   • HERNIA REPAIR     • KNEE SURGERY Left     Medial meniscus sx   • TOTAL HIP ARTHROPLASTY Left 2/12/2020    Procedure: TOTAL HIP ARTHROPLASTY LEFT;  Surgeon: Dom Terry MD;  Location: [facility];  Service: Orthopedics;  Laterality: Left;      Family History   Problem Relation Age of Onset   • Heart failure Mother    • Diabetes Mother    • Stroke Mother    • Cancer Mother    • Osteoarthritis Mother    • Hypertension Father    • Heart failure Father      Social History     Socioeconomic History   • Marital status: Single     Spouse name: Not on file   • Number of children: Not on file   • Years of education: Not on file   • Highest education level: Not on file   Tobacco Use "   • Smoking status: Light Tobacco Smoker     Types: Cigars   • Smokeless tobacco: Never Used   • Tobacco comment: cigar rarely; never smoked cigarettes    Substance and Sexual Activity   • Alcohol use: Yes     Comment: 1 -2 drinks daily on average   • Drug use: No   • Sexual activity: Defer   Social History Narrative    Mr. hSah is a 52 year old white  male who is currently going through a divorce with his wife. He has two children. 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 He does not have a living will or advanced directive.      Current Outpatient Medications on File Prior to Visit   Medication Sig Dispense Refill   • acetaminophen (TYLENOL) 500 MG tablet Take 1,000 mg by mouth As Needed for Mild Pain .     • amitriptyline (ELAVIL) 10 MG tablet Take 12.5 mg by mouth At Night As Needed for Sleep.     • aspirin 325 MG EC tablet Take 1 tablet by mouth Every 12 (Twelve) Hours. 60 tablet 0   • docusate sodium 100 MG capsule Take 100 mg by mouth 2 (Two) Times a Day As Needed for Constipation. May take alternative stool softener you have at home.     • finasteride (PROSCAR) 5 MG tablet Take  by mouth Daily. 1/3 of tablet     • Loratadine 10 MG capsule Take 10 mg by mouth Daily As Needed (allergies).     • Multiple Vitamins-Minerals (MULTIVITAMIN ADULT PO) Take 1 tablet by mouth Daily.     • naproxen (NAPROSYN) 500 MG tablet Take 500 mg by mouth 2 (Two) Times a Day With Meals.     • pravastatin (PRAVACHOL) 40 MG tablet Take 40 mg by mouth Daily.     • psyllium (METAMUCIL) 58.6 % packet Take 1 packet by mouth Daily.       No current facility-administered medications on file prior to visit.       No Known Allergies     Review of Systems   Constitutional: Negative.    HENT: Negative.    Eyes: Negative.    Respiratory: Negative.    Cardiovascular: Negative.    Gastrointestinal: Negative.    Endocrine: Negative.    Genitourinary: Negative.    Musculoskeletal: Positive for arthralgias.   Skin:  "Negative.    Allergic/Immunologic: Negative.    Neurological: Negative.    Hematological: Negative.    Psychiatric/Behavioral: Negative.         Physical Exam  Pulse 100, height 180.3 cm (71\"), weight 91.6 kg (202 lb), SpO2 98 %.    Body mass index is 28.17 kg/m².    GENERAL APPEARANCE: awake, alert, oriented, in no acute distress and well developed, well nourished  LUNGS:  breathing nonlabored  EXTREMITIES: no clubbing, cyanosis  PERIPHERAL PULSES: palpable dorsalis pedis and posterior tibial pulses bilaterally.    GAIT:  Normal            Hip Exam:  Left    RANGE OF MOTION:  EXTENSION/FLEXION:  normal (0-110 degrees)  IR:  20  ER:  40  PAIN WITH HIP MOTION:  no  PAIN WITH LOGROLL:  no     STRENGTH:  ABDUCTOR:  5/5  ADDUCTOR:  5/5  HIP FLEXION:  5/5    GREATER TROCHANTER BURSAL PAIN:  no    SENSATION TO LIGHT TOUCH:  DEEP PERONEAL/SUPERFICIAL PERONEAL/SURAL/SAPHENOUS/TIBIAL:   intact    EDEMA:  no  ERYTHEMA:  no  WOUNDS/INCISIONS:   yes, well healed surgical incision without evidence of erythema or drainage  _______________________________________________________________  _______________________________________________________________    RADIOGRAPHIC FINDINGS:   Indication: Status post left total hip arthroplasty    Comparison: Todays xrays were compared to previous xrays from 3/24/2020    AP pelvis: Right: moderate joint space narrowing,  there are marginal osteophytes visualized at the femoral head-neck junction;Left: Demonstrate a well positioned total hip without evidence of wear, loosening, fracture or osteolysis, femoral head is concentrically reduced within the acetabulum and No significant changes compared to prior radiographs.        Assessment/Plan:   Diagnosis Plan   1. Status post total replacement of left hip  XR Pelvis 1 or 2 View     Patient is doing well 3-status post left total hip arthroplasty.  I recommended continued activity as tolerated without restrictions.  I will see patient back in 9 " months for his one-year anniversary with x-ray AP pelvis on return.    Dom Terry MD  05/26/20  16:17

## 2020-09-09 ENCOUNTER — OFFICE VISIT (OUTPATIENT)
Dept: ORTHOPEDIC SURGERY | Facility: CLINIC | Age: 55
End: 2020-09-09

## 2020-09-09 VITALS — HEIGHT: 71 IN | HEART RATE: 87 BPM | WEIGHT: 199.4 LBS | OXYGEN SATURATION: 99 % | BODY MASS INDEX: 27.92 KG/M2

## 2020-09-09 DIAGNOSIS — M79.671 PAIN IN BOTH FEET: Primary | ICD-10-CM

## 2020-09-09 DIAGNOSIS — M79.672 PAIN IN BOTH FEET: Primary | ICD-10-CM

## 2020-09-09 DIAGNOSIS — M77.42 METATARSALGIA OF BOTH FEET: ICD-10-CM

## 2020-09-09 DIAGNOSIS — M77.41 METATARSALGIA OF BOTH FEET: ICD-10-CM

## 2020-09-09 DIAGNOSIS — M20.22 ACQUIRED HALLUX RIGIDUS OF LEFT FOOT: ICD-10-CM

## 2020-09-09 DIAGNOSIS — M20.21 ACQUIRED HALLUX RIGIDUS OF RIGHT FOOT: ICD-10-CM

## 2020-09-09 PROCEDURE — 99213 OFFICE O/P EST LOW 20 MIN: CPT | Performed by: ORTHOPAEDIC SURGERY

## 2020-09-09 NOTE — PROGRESS NOTES
NEW PATIENT    Patient: Kameron Khan  : 1965    Primary Care Provider: KARRIE Perez MD    Requesting Provider: As above    Pain of the Left Foot and Pain of the Right Foot      History    Chief Complaint: bilateral foot pain    History of Present Illness: this is a very pleasant 55 year old gentlman with bilateral foot pain.  He has hallux rigidus, and I did a cheilectomy and Wes osteotomy many years ago.  He reports that he still has very good pain relief.  He is here with pain under the 2nd metatarsal heads bilaterally. It feels as if he is walking on a rock, worse barefoot and better with padding.  He has turf toe plates (full length) that he still wears.  He has not had new orthotics in many years.  He rates the pain as 2-/10, sharp and aching    Current Outpatient Medications on File Prior to Visit   Medication Sig Dispense Refill   • acetaminophen (TYLENOL) 500 MG tablet Take 1,000 mg by mouth As Needed for Mild Pain .     • amitriptyline (ELAVIL) 10 MG tablet Take 12.5 mg by mouth At Night As Needed for Sleep.     • aspirin 325 MG EC tablet Take 1 tablet by mouth Every 12 (Twelve) Hours. 60 tablet 0   • docusate sodium 100 MG capsule Take 100 mg by mouth 2 (Two) Times a Day As Needed for Constipation. May take alternative stool softener you have at home.     • finasteride (PROSCAR) 5 MG tablet Take  by mouth Daily. 1/3 of tablet     • Loratadine 10 MG capsule Take 10 mg by mouth Daily As Needed (allergies).     • Multiple Vitamins-Minerals (MULTIVITAMIN ADULT PO) Take 1 tablet by mouth Daily.     • naproxen (NAPROSYN) 500 MG tablet Take 500 mg by mouth 2 (Two) Times a Day With Meals.     • pravastatin (PRAVACHOL) 40 MG tablet Take 40 mg by mouth Daily.     • psyllium (METAMUCIL) 58.6 % packet Take 1 packet by mouth Daily.       No current facility-administered medications on file prior to visit.       No Known Allergies   Past Medical History:   Diagnosis Date   • Alcohol use  "5/24/2017   • Allergic    • Arthritis 5/24/2017   • Diverticulitis of sigmoid colon 05/24/2017   • Elevated liver enzymes 5/24/2017   • History of transfusion     s/p hiatal hernia surgery- no reaction to blood   • Hyperglycemia 5/24/2017   • Hyperlipidemia    • IGT (impaired glucose tolerance) 5/24/2017   • Leukocytosis 05/24/2017   • Wears glasses     Readers      Past Surgical History:   Procedure Laterality Date   • ABDOMINAL SURGERY      pt reports hx years ago a surgery for hiatal hernia with esophageal reconstruction, \"I can't throw up\"   • COLONOSCOPY  2018   • ENDOSCOPY     • FOOT SURGERY Right     Chilectomy and osteotomy- [facility]   • HERNIA REPAIR     • KNEE SURGERY Left     Medial meniscus sx   • TOTAL HIP ARTHROPLASTY Left 2/12/2020    Procedure: TOTAL HIP ARTHROPLASTY LEFT;  Surgeon: Dom Terry MD;  Location: [facility];  Service: Orthopedics;  Laterality: Left;     Family History   Problem Relation Age of Onset   • Heart failure Mother    • Diabetes Mother    • Stroke Mother    • Cancer Mother    • Osteoarthritis Mother    • Hypertension Father    • Heart failure Father       Social History     Socioeconomic History   • Marital status: Single     Spouse name: Not on file   • Number of children: Not on file   • Years of education: Not on file   • Highest education level: Not on file   Tobacco Use   • Smoking status: Light Tobacco Smoker     Types: Cigars   • Smokeless tobacco: Never Used   • Tobacco comment: cigar rarely; never smoked cigarettes    Substance and Sexual Activity   • Alcohol use: Yes     Comment: 1 -2 drinks daily on average   • Drug use: No   • Sexual activity: Defer   Social History Narrative    Mr. Shah is a 52 year old white  male who is currently going through a divorce with his wife. He has two children. 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 He does not have a living will or advanced directive.        Review of Systems   Constitutional: " "Negative.    HENT: Negative.    Eyes: Negative.    Respiratory: Negative.    Cardiovascular: Negative.    Gastrointestinal: Negative.    Endocrine: Negative.    Genitourinary: Negative.    Musculoskeletal: Positive for arthralgias.   Skin: Negative.    Allergic/Immunologic: Negative.    Neurological: Negative.    Hematological: Negative.    Psychiatric/Behavioral: Negative.        The following portions of the patient's history were reviewed and updated as appropriate: allergies, current medications, past family history, past medical history, past social history, past surgical history and problem list.    Physical Exam:   Pulse 87   Ht 180.3 cm (70.98\")   Wt 90.4 kg (199 lb 6.4 oz)   SpO2 99%   BMI 27.82 kg/m²   GENERAL: Body habitus: normal weight for height    Lower extremity edema: Right: none; Left: none    Varicose veins:  Right: none; Left: none    Gait: antalgic with the first few steps     Mental Status:  awake and alert; oriented to person, place, and time    Voice:  clear  SKIN:  Lower extremity: Normal and warm and dry    Hair Growth(lower extremity):  Right:normal; Left:  normal  NAILS: Toenails: normal  HEENT: Head: Normocephalic, atraumatic,  without obvious abnormality.  eye: normal external eye, no icterus  ears:normal external ears  PULM:  Repiratory effort normal  CV:  Dorsalis Pedis:  Right: 2+; Left:2+    Posterior Tibial: Right:2+; Left:2+    Capillary Refill:  Brisk  MSK:  Hand:sensation intact      Tibia:  Right:  non tender; Left:  non tender      Ankle:  Right: non tender; Left:  non tender      Foot:  Right:  tender under 2nd MTPJ, no swelling, healed incison over 1st MTPJ, not tender, moderately stiff; Left:  tender under 2nd MTPJ, no swelling, great toe is stiff but not tender      NEURO: Heel Walking:  Right:  normal; Left:  normal    Toe Walking:  Right:  pain under 2nd mt; Left:  tender under 2nd mt     Gilbert-Suzy 5.07 monofilament test: normal    Lower extremity sensation: " intact     Reflexes:  Biceps:  Right:  not tested; Left:  not tested           Quads:  Right:  not tested; Left:  not tested           Ankle:  Right:  not tested; Left:  not tested      Calf Atrophy:none    Motor Function: all 5/5         Medical Decision Making    Data Review:   ordered and reviewed x-rays today    Assessment and Plan/ Diagnosis/Treatment options:   1. Metatarsalgia of both feet  Bilateral metatarsalgia.. I explained this to him, how we develop different pressure points over time.  I recommend custom orthotics with metatarsal posting and turf toe plates.  I put a metatarsal pad on the current insertw for him.  We discussed activity, etc.  I advised him we do not have surgery for this.  He understands, I will be happy to see him any time    2. Acquired hallux rigidus of right foot  As above- no pain    3. Acquired hallux rigidus of left foot  As above      - XR Foot 2 View Bilateral            Radiology Ordered []  Radiology Reports Reviewed []      Radiology Images Reviewed []   Labs Reviewed []    Labs Ordered []   PCP Records Reviewed []    Provider Records Reviewed []    ER Records Reviewed []    Hospital Records Reviewed []    History Obtained From Family []    Phone conversation with Provider []    Records Requested []

## 2021-02-23 ENCOUNTER — OFFICE VISIT (OUTPATIENT)
Dept: ORTHOPEDIC SURGERY | Facility: CLINIC | Age: 56
End: 2021-02-23

## 2021-02-23 VITALS — WEIGHT: 205.4 LBS | OXYGEN SATURATION: 98 % | HEIGHT: 71 IN | BODY MASS INDEX: 28.76 KG/M2 | HEART RATE: 79 BPM

## 2021-02-23 DIAGNOSIS — M24.852 SNAPPING HIP SYNDROME, LEFT: ICD-10-CM

## 2021-02-23 DIAGNOSIS — Z96.642 STATUS POST TOTAL REPLACEMENT OF LEFT HIP: Primary | ICD-10-CM

## 2021-02-23 PROCEDURE — 99213 OFFICE O/P EST LOW 20 MIN: CPT | Performed by: ORTHOPAEDIC SURGERY

## 2021-02-23 ASSESSMENT — HOOS JR
HOOS JR SCORE: 67.516
HOOS JR SCORE: 7

## 2021-02-23 NOTE — PROGRESS NOTES
"Orthopaedic Clinic Note: Hip Established Patient    Chief Complaint   Patient presents with   • Follow-up     9 month follow up; 1 year status post Total Hip Arthroplasty Left 2/12/20        HPI    It has been 9  month(s) since Mr. Khan's last visit. He returns to clinic today for 1 year follow-up status post left total hip arthroplasty.  Rates his pain 0/10 on the pain scale.  He is ambulating with no assistive device.  Overall he is happy with his outcome.  He does note that he occasionally has a popping sensation localized lateral hip that is nonpainful.  He denies any persistent or ongoing pain.  He is able to do all daily activities limitations.  Overall he is happy with his outcome.    Past Medical History:   Diagnosis Date   • Alcohol use 5/24/2017   • Allergic    • Arthritis 5/24/2017   • Diverticulitis of sigmoid colon 05/24/2017   • Elevated liver enzymes 5/24/2017   • History of transfusion     s/p hiatal hernia surgery- no reaction to blood   • Hyperglycemia 5/24/2017   • Hyperlipidemia    • IGT (impaired glucose tolerance) 5/24/2017   • Leukocytosis 05/24/2017   • Wears glasses     Readers       Past Surgical History:   Procedure Laterality Date   • ABDOMINAL SURGERY      pt reports hx years ago a surgery for hiatal hernia with esophageal reconstruction, \"I can't throw up\"   • COLONOSCOPY  2018   • ENDOSCOPY     • FOOT SURGERY Right     Chilectomy and osteotomy- [facility]   • HERNIA REPAIR     • KNEE SURGERY Left     Medial meniscus sx   • TOTAL HIP ARTHROPLASTY Left 2/12/2020    Procedure: TOTAL HIP ARTHROPLASTY LEFT;  Surgeon: Dom Terry MD;  Location: [facility];  Service: Orthopedics;  Laterality: Left;      Family History   Problem Relation Age of Onset   • Heart failure Mother    • Diabetes Mother    • Stroke Mother    • Cancer Mother    • Osteoarthritis Mother    • Hypertension Father    • Heart failure Father      Social History     Socioeconomic History   • Marital status: Single    "  Spouse name: Not on file   • Number of children: Not on file   • Years of education: Not on file   • Highest education level: Not on file   Tobacco Use   • Smoking status: Light Tobacco Smoker     Types: Cigars   • Smokeless tobacco: Never Used   • Tobacco comment: cigar rarely; never smoked cigarettes    Substance and Sexual Activity   • Alcohol use: Yes     Comment: 1 -2 drinks daily on average   • Drug use: No   • Sexual activity: Defer   Social History Narrative    Mr. Shah is a 52 year old white  male who is currently going through a divorce with his wife. He has two children. 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 He does not have a living will or advanced directive.      Current Outpatient Medications on File Prior to Visit   Medication Sig Dispense Refill   • acetaminophen (TYLENOL) 500 MG tablet Take 1,000 mg by mouth As Needed for Mild Pain .     • amitriptyline (ELAVIL) 10 MG tablet Take 12.5 mg by mouth At Night As Needed for Sleep.     • docusate sodium 100 MG capsule Take 100 mg by mouth 2 (Two) Times a Day As Needed for Constipation. May take alternative stool softener you have at home.     • finasteride (PROSCAR) 5 MG tablet Take  by mouth Daily. 1/3 of tablet     • Loratadine 10 MG capsule Take 10 mg by mouth Daily As Needed (allergies).     • Multiple Vitamins-Minerals (MULTIVITAMIN ADULT PO) Take 1 tablet by mouth Daily.     • naproxen (NAPROSYN) 500 MG tablet Take 500 mg by mouth 2 (Two) Times a Day With Meals.     • pravastatin (PRAVACHOL) 40 MG tablet Take 40 mg by mouth Daily.     • psyllium (METAMUCIL) 58.6 % packet Take 1 packet by mouth Daily.     • [DISCONTINUED] aspirin 325 MG EC tablet Take 1 tablet by mouth Every 12 (Twelve) Hours. 60 tablet 0     No current facility-administered medications on file prior to visit.       No Known Allergies     Review of Systems   Constitutional: Negative.    HENT: Negative.    Eyes: Negative.    Respiratory:  "Negative.    Cardiovascular: Negative.    Gastrointestinal: Negative.    Endocrine: Negative.    Genitourinary: Negative.    Musculoskeletal: Positive for arthralgias.   Skin: Negative.    Allergic/Immunologic: Negative.    Neurological: Negative.    Hematological: Negative.    Psychiatric/Behavioral: Negative.         The patient's Review of Systems was personally reviewed and confirmed as accurate.    Physical Exam  Pulse 79, height 180.3 cm (70.98\"), weight 93.2 kg (205 lb 6.4 oz), SpO2 98 %.    Body mass index is 28.66 kg/m².    GENERAL APPEARANCE: awake, alert, oriented, in no acute distress and well developed, well nourished  LUNGS:  breathing nonlabored  EXTREMITIES: no clubbing, cyanosis  PERIPHERAL PULSES: palpable dorsalis pedis and posterior tibial pulses bilaterally.    GAIT:  Normal            Hip Exam:  Left    RANGE OF MOTION:  EXTENSION/FLEXION:  normal (0-110 degrees)  IR (at 90 degrees of flexion):  20  ER (at 90 degrees of flexion):  40  PAIN WITH HIP MOTION:  no  PAIN WITH LOGROLL:  no     STINCHFIELD TEST: negative    STRENGTH:  ABDUCTOR:  5/5  ADDUCTOR:  5/5  HIP FLEXION:  5/5    GREATER TROCHANTER BURSAL PAIN:  no    SENSATION TO LIGHT TOUCH:  DEEP PERONEAL/SUPERFICIAL PERONEAL/SURAL/SAPHENOUS/TIBIAL:   intact    EDEMA:  no  ERYTHEMA:  no  WOUNDS/INCISIONS:   yes, well healed surgical incision without evidence of erythema or drainage  _________________________________________________________________  _________________________________________________________________    RADIOGRAPHIC FINDINGS:   Indication: Status post left total hip arthroplasty    Comparison: Todays xrays were compared to previous xrays from 5/26/2020    AP pelvis: Right: mild joint space narrowing, there are marginal osteophytes visualized at the femoral head-neck junction and acetabular margins and No significant changes compared to prior radiographs.;Left: Demonstrate a well positioned total hip without evidence of wear, " loosening, fracture or osteolysis, femoral head is concentrically reduced within the acetabulum and No significant changes compared to prior radiographs.      Assessment/Plan:   Diagnosis Plan   1. Status post total replacement of left hip  XR Pelvis 1 or 2 View   2. Snapping hip syndrome, left       Patient is doing well 1 year status post left total hip arthroplasty.  His residual popping is due to scar tissue formation of the IT band.  I explained that this is not problematic as it generates no pain.  I recommend continued activity as tolerated without restrictions.  I will see the patient back in 2 years for repeat evaluation with x-rays of the left hip and AP pelvis on return.  He is welcome to follow-up sooner should problems arise.    Dom Terry MD  02/23/21  11:44 EST

## 2023-02-07 ENCOUNTER — OFFICE VISIT (OUTPATIENT)
Dept: ORTHOPEDIC SURGERY | Facility: CLINIC | Age: 58
End: 2023-02-07
Payer: COMMERCIAL

## 2023-02-07 VITALS
BODY MASS INDEX: 27.3 KG/M2 | WEIGHT: 195 LBS | DIASTOLIC BLOOD PRESSURE: 80 MMHG | HEIGHT: 71 IN | SYSTOLIC BLOOD PRESSURE: 122 MMHG

## 2023-02-07 DIAGNOSIS — Z96.642 STATUS POST TOTAL REPLACEMENT OF LEFT HIP: Primary | ICD-10-CM

## 2023-02-07 PROCEDURE — 99212 OFFICE O/P EST SF 10 MIN: CPT | Performed by: ORTHOPAEDIC SURGERY

## 2023-02-07 RX ORDER — CELECOXIB 200 MG/1
200 CAPSULE ORAL DAILY
COMMUNITY

## 2023-02-07 NOTE — PROGRESS NOTES
"Orthopaedic Clinic Note: Hip Established Patient    Chief Complaint   Patient presents with   • Follow-up     2 year follow up -- 3 year status post Total Hip Arthroplasty Left 2/12/20        HPI    It has been 2  year(s) since Mr. Khan's last visit. He returns to clinic today for follow-up left total hip arthroplasty.  He is 3 years out from surgery.  Rates pain 0/10 on pain scale.  He is ambulating with no assistive device.  Denies fevers chills or constitutional symptoms.  Overall he is happy his outcome.    Past Medical History:   Diagnosis Date   • Alcohol use 5/24/2017   • Allergic    • Arthritis 5/24/2017   • Diverticulitis of sigmoid colon 05/24/2017   • Elevated liver enzymes 5/24/2017   • History of transfusion     s/p hiatal hernia surgery- no reaction to blood   • Hyperglycemia 5/24/2017   • Hyperlipidemia    • IGT (impaired glucose tolerance) 5/24/2017   • Leukocytosis 05/24/2017   • Wears glasses     Readers       Past Surgical History:   Procedure Laterality Date   • ABDOMINAL SURGERY      pt reports hx years ago a surgery for hiatal hernia with esophageal reconstruction, \"I can't throw up\"   • COLONOSCOPY  2018   • ENDOSCOPY     • FOOT SURGERY Right     Chilectomy and osteotomy- [facility]   • HERNIA REPAIR     • KNEE SURGERY Left     Medial meniscus sx   • TOTAL HIP ARTHROPLASTY Left 2/12/2020    Procedure: TOTAL HIP ARTHROPLASTY LEFT;  Surgeon: Dmo Terry MD;  Location: [facility];  Service: Orthopedics;  Laterality: Left;      Family History   Problem Relation Age of Onset   • Heart failure Mother    • Diabetes Mother    • Stroke Mother    • Cancer Mother    • Osteoarthritis Mother    • Hypertension Father    • Heart failure Father      Social History     Socioeconomic History   • Marital status: Single   Tobacco Use   • Smoking status: Light Smoker     Types: Cigars   • Smokeless tobacco: Never   • Tobacco comments:     cigar rarely; never smoked cigarettes    Substance and Sexual " "Activity   • Alcohol use: Yes     Comment: 1 -2 drinks daily on average   • Drug use: No   • Sexual activity: Defer      Current Outpatient Medications on File Prior to Visit   Medication Sig Dispense Refill   • acetaminophen (TYLENOL) 500 MG tablet Take 1,000 mg by mouth As Needed for Mild Pain .     • amitriptyline (ELAVIL) 10 MG tablet Take 12.5 mg by mouth At Night As Needed for Sleep.     • celecoxib (CeleBREX) 200 MG capsule Take 200 mg by mouth Daily.     • finasteride (PROSCAR) 5 MG tablet Take  by mouth Daily. 1/3 of tablet     • Loratadine 10 MG capsule Take 10 mg by mouth Daily As Needed (allergies).     • Multiple Vitamins-Minerals (MULTIVITAMIN ADULT PO) Take 1 tablet by mouth Daily.     • pravastatin (PRAVACHOL) 40 MG tablet Take 40 mg by mouth Daily.     • psyllium (METAMUCIL) 58.6 % packet Take 1 packet by mouth Daily.     • [DISCONTINUED] docusate sodium 100 MG capsule Take 100 mg by mouth 2 (Two) Times a Day As Needed for Constipation. May take alternative stool softener you have at home.     • [DISCONTINUED] naproxen (NAPROSYN) 500 MG tablet Take 500 mg by mouth 2 (Two) Times a Day With Meals.       No current facility-administered medications on file prior to visit.      No Known Allergies     Review of Systems   Constitutional: Negative.    HENT: Negative.    Eyes: Negative.    Respiratory: Negative.    Cardiovascular: Negative.    Gastrointestinal: Negative.    Endocrine: Negative.    Genitourinary: Negative.    Musculoskeletal: Positive for arthralgias.   Skin: Negative.    Allergic/Immunologic: Negative.    Neurological: Negative.    Hematological: Negative.    Psychiatric/Behavioral: Negative.         The patient's Review of Systems was personally reviewed and confirmed as accurate.    Physical Exam  Blood pressure 122/80, height 180.3 cm (70.98\"), weight 88.5 kg (195 lb).    Body mass index is 27.21 kg/m².    GENERAL APPEARANCE: awake, alert, oriented, in no acute distress and well " developed, well nourished  LUNGS:  breathing nonlabored  EXTREMITIES: no clubbing, cyanosis  PERIPHERAL PULSES: palpable dorsalis pedis and posterior tibial pulses bilaterally.    GAIT:  Normal            Hip Exam:  Left    RANGE OF MOTION:  EXTENSION/FLEXION:  normal (0-110 degrees)  IR (at 90 degrees of flexion):  20  ER (at 90 degrees of flexion):  40  PAIN WITH HIP MOTION:  no  PAIN WITH LOGROLL:  no     STINCHFIELD TEST: negative    STRENGTH:  ABDUCTOR:  5/5  ADDUCTOR:  5/5  HIP FLEXION:  5/5    GREATER TROCHANTER BURSAL PAIN:  no    SENSATION TO LIGHT TOUCH:  DEEP PERONEAL/SUPERFICIAL PERONEAL/SURAL/SAPHENOUS/TIBIAL:   intact    EDEMA:  no  ERYTHEMA:  no  WOUNDS/INCISIONS:   yes, well healed surgical incision without evidence of erythema or drainage  _________________________________________________________________  _________________________________________________________________    RADIOGRAPHIC FINDINGS:   Indication: Status post left total hip arthroplasty    Comparison: Todays xrays were compared to previous xrays from 2/23/2021    AP pelvis: Right: moderate joint space narrowing,  minimal osteophyte formation and No significant changes compared to prior radiographs.;Left: Demonstrate a well positioned total hip without evidence of wear, loosening, fracture or osteolysis, femoral head is concentrically reduced within the acetabulum and No significant changes compared to prior radiographs.      Assessment/Plan:   Diagnosis Plan   1. Status post total replacement of left hip  XR Pelvis 1 or 2 View        Patient is doing well 3 years status post left total hip arthroplasty.  I recommend activity as tolerated without restrictions.  I will see the patient back in 5 years for repeat assessment x-ray AP pelvis on return.  He is welcome follow-up sooner should problems arise    Dom Terry MD  02/07/23  10:04 EST

## 2023-02-16 DIAGNOSIS — Z12.11 SCREENING FOR COLORECTAL CANCER: Primary | ICD-10-CM

## 2023-02-16 DIAGNOSIS — Z12.12 SCREENING FOR COLORECTAL CANCER: Primary | ICD-10-CM

## 2023-03-16 ENCOUNTER — LAB REQUISITION (OUTPATIENT)
Dept: LAB | Facility: HOSPITAL | Age: 58
End: 2023-03-16
Payer: COMMERCIAL

## 2023-03-16 ENCOUNTER — OUTSIDE FACILITY SERVICE (OUTPATIENT)
Dept: GASTROENTEROLOGY | Facility: CLINIC | Age: 58
End: 2023-03-16
Payer: COMMERCIAL

## 2023-03-16 DIAGNOSIS — Z80.0 FAMILY HISTORY OF MALIGNANT NEOPLASM OF DIGESTIVE ORGANS: ICD-10-CM

## 2023-03-16 PROCEDURE — 88305 TISSUE EXAM BY PATHOLOGIST: CPT

## 2023-03-16 PROCEDURE — G0500 MOD SEDAT ENDO SERVICE >5YRS: HCPCS | Performed by: INTERNAL MEDICINE

## 2023-03-16 PROCEDURE — 45385 COLONOSCOPY W/LESION REMOVAL: CPT | Performed by: INTERNAL MEDICINE

## 2023-03-17 LAB — REF LAB TEST METHOD: NORMAL

## 2023-05-15 NOTE — OP NOTE
OPERATIVE REPORT     DATE OF PROCEDURE: 2/12/2020    SURGEON: Dom Terry M.D.     ASSISTANT(S): Circulator: Benita Munoz RN  Scrub Person: Nancy De Anda; Feroz Meyers  Physician assistant: Sunita Barone PA-C  Nursing Assistant: Dinora Sanders-PA was utilized during the case to facilitate positioning the patient, exposure, retraction, placement of final components and definitive closure.    PREOPERATIVE DIAGNOSIS: Advanced degenerative joint disease of the left hip secondary to osteoarthritis    POSTOPERATIVE DIAGNOSIS: same     PROCEDURE: Left total Hip Arthroplasty     SURGICAL DETAILS:     APPROACH: Posterior    ANESTHESIA: Spinal plus local periarticular block    PREOPERATIVE ANTIBIOTICS: Ancef 2 g IV    TRANEXAMIC ACID: IV    ESTIMATED BLOOD LOSS: 250 cc     SPECIMENS: None    IMPLANTS:   : Cyndie  Acetabular component: 54 mm Trident 2   Acetabular screws: 2  Acetabular liner: 0 degree x3   Femoral component: Accolade  degree size 7  Femoral head: 36+ 2.5 mm Biolox ceramic     DRAINS: None    LOCAL INJECTION: 1 cc Toradol 30mg/ml, 4 cc duramorph 2mg/ml, 20 cc 0.5% ropivicaine, 20 cc 0.5% lidocaine with 1:200,000 epinephrine, 15 cc preservative free normal saline     MODIFIER(S): None    COMPLICATIONS: None apparent    INDICATIONS FOR PROCEDURE: This patient has a history of progressive left hip pain and arthritis. The hip pain is severe with activity and has progressed significantly. Non-operative treatment has been attempted, but has not improved or controlled symptoms during normal daily activities. Motion has become limited and rotation severely restricted. X-rays reveal moderate-to-severe eburnation of articular cartilage on the superior weight bearing surface of the hip with circumferential acetabular and femoral neck osteophytes consistent with advanced hip osteoarthritis. A total hip arthroplasty was recommended at this time. The risks,  ORTHOPAEDIC POST-OP VISIT    Chief Complaint   Patient presents with   • Office Visit   • Hand     Post op, DOS 05/05/2023 CARPAL TUNNEL RELEASE RIGHT; TRIGGER FINGER RELEASE AND SYNOVECTOMY RIGHT THUMB AND RIGHT MIDDLE FINGER; CORTISONE INJECTION LEFT WRIST        Date of surgery: 5/5/2023  Surgeon:  Dr. Buckner    SUBJECTIVE:  The patient presents for right carpal tunnel release, trigger finger release right thumb, trigger finger release right middle finger and cortisone injection into the left wrist  postoperative check.  The patient states that her numbness to her right hand is much better. She report can now bend and move her fingers easier. She does not feel the cortisone injection to the left wrist for her synovitis has helped with her pain much.     OBJECTIVE:  The wounds on the right hand are inspected.  The wounds are healing appropriately with no increased redness, warmth, purulent drainage or signs of infection. Sutures are removed. She can make a full fist.    There is continued mild synovitis noted to the left dorsal wrist.      IMPRESSION:  Status post right carpal tunnel release, trigger finger release right thumb, trigger finger release right middle finger and cortisone injection into the left wrist .     Encounter Diagnoses   Name Primary?   • S/P carpal tunnel release Yes   • S/P trigger finger release    • Synovitis of left wrist        PLAN:  I discussed the surgical procedure and answered any questions she had.   I am recommending that she progress her activity as her discomfort allows; no activity restrictions.  It was discussed prior to surgery that once constant numbness is present, as in severe carpal tunnel syndrome, that feature of the problem is typically permanent, even after surgery. It can take 6-12 months to know the extent of improvement in her symptoms.     Wound care was discussed with the patient.  I advised use of over-the-counter Tylenol and/or NSAIDs as needed for discomfort.  I  benefits, alternatives, and potential complications of the arthroplasty surgery were discussed with the patient in detail to include but not limited to infection, bleeding, anesthesia risks, sciatic nerve palsy, instability/dislocation, limb length discrepancy, aseptic loosening, osteolysis, blood clots, continued pain, iatrogenic fracture, myocardial infarction, stroke, and death. Specific details of the procedure, hospitalization, recovery, rehabilitation, and long-term precautions were also provided. Pre-operative teaching was provided. Implant/prosthesis selection was outlined, and the many options available were explained; the final choice will be made at the time of the procedure to match the anatomy and condition of the bone, ligaments, tendons, and muscles. Understanding of all topics was conveyed to me by the patient, and consent was given to proceed with a left total hip arthroplasty. The patient completed preoperative medical optimization and risk assessment, joint arthroplasty education, and MRSA decolonization using a universal decolonization protocol. Perioperative blood management and the potential for blood transfusion were discussed with risks and options clearly outlined.     INTRAOPERATIVE FINDINGS: End-stage osteoarthritis left hip    PROCEDURE: The patient was identified in the preoperative holding area. The operative site was confirmed and marked. ALPHONSO hose and a sequential compression device were placed on the nonoperative leg. The risks, benefits, and alternatives to surgery were again confirmed with the patient and the patient wished to proceed. The patient was brought to the operating room and placed on the operating room table in the supine position. A huddle was performed with the patient and all vital surgical team members to confirm the correct operative site, procedure, anesthesia type, and operative plan with the patient. After anesthesia was performed, the patient was positioned in  discussed with the patient that the discomfort and swelling at and near the surgical site is expected, this will improve with time.    She has synovitis to her left wrist. I discussed the different causes of this, most commonly either idiopathic or related to underlying inflammatory condition. She reports she also experiences pain and swelling to her feet and legs. She may benefit from inflammatory work up and I will message her PCP.     Follow-up as needed.    DEEDEE Carranza  5/15/2023       the lateral decubitus position on the pegboard and secured with the operative side up. An axillary roll was placed in the axilla and all bony prominences and pressure points were checked and padded. A relative leg length assessment was carried out and markers were placed for intraoperative assessment. Intravenous antibiotic prophylaxis was given and confirmed with the anesthesia team.     The operative leg was prepped and draped in the usual sterile fashion. A surgical time out was performed immediately preceding the incision with all personnel in the operating room to again confirm patient identity, the correct operative site and extremity, correct radiographic studies, availability of appropriate surgical equipment and agreement on the planned procedure. A posterolateral approach to the hip was performed through an incision centered over the greater trochanter. The incision was carried through the subcutaneous tissue to the underlying fascia macho and gluteus marysol fascia, which were incised and split posteriorly over the trochanter in the direction of the fibers. Hemostasis was obtained with electrocautery. The Charnley retractor was placed after carefully palpating the sciatic nerve which was protected throughout the case.     A standard posterior approach to the hip was performed by releasing the piriformis, short external rotators and posterior capsule and reflecting them posteriorly as a rectangular flap. The superior capsule was scarred down; it was released and excised. The labrum was split and the femoral head mobilized. The hip was then flexed, internally rotated and dislocated from the acetabulum without excessive force. Assessment of the femoral head revealed eburnation of the articular cartilage with complete loss of the weight bearing chondral surface. Osteophytes were present as well. Careful measurements were performed using the center of the femoral head and the lesser trochanter as markers and a  femoral neck cut was made according to the preoperative plan.     Attention was then turned to the acetabulum. Retractors were placed circumferentially for wide acetabular exposure. The labrum and osteophytes were debrided from the rim, and the medial wall was identified and the depth of the socket assessed by excising the pulvinar. Bleeders were controlled, especially the area of the obturator artery with the electrocautery. Acetabular reaming was then started with the hemispherical instrument matching the size of the excised femoral head. Sequential reaming of the acetabulum was then performed by increasing size in 2 mm increments.  Reaming was performed line to line. The reamers created an excellent hemispherical bed of bleeding cancellous bone. The cup was impacted into position, targeting 40-45 degrees of abduction and 20-25 degrees of anteversion, with an excellent press-fit. The press-fit was firm, stable, and apically seated.  2 screw(s) were used for additional support of the fixation. Further osteophyte debridement was done around the socket. All impinging soft tissue was removed from the edges of the socket. The polyethylene bearing/liner was then impacted into place and checked for stability.     Attention was then turned to the femur. The leg was positioned so access did not result in soft-tissue injury. The femoral preparation was started with a box osteotome. The medullary cavity of the femur was then entered and opened with hand reamers. Femoral stem broaches were then employed in an incremental fashion up to the final size, targeting 15-20 degrees of anteversion. The final broach was fully seated, had good rotational and axial stability, and was seated at the appropriate height in relation to the greater trochanter and the preoperative plan. Trial reduction was done. Excellent stability and range of motion was achieved without impingement at any position. The hip was stable in full extension and  external rotation as well as in flexion past 90 degrees, 20 degrees adduction and 60-70 degrees of internal rotation. Leg lengths were re-created within millimeters based on the markers and relative measurement. The hip was then dislocated and the trials removed. The wound was copiously irrigated, and the permanent femoral stem was then impacted down in approximately 15-20 degrees of anteversion. The press-fit was firm, and stable to axial and rotational force in all planes. The permanent femoral head was then impacted on the clean trunnion. The socket and wound were irrigated, suctioned, and inspected for debris. The final reduction was performed, and again leg length assessment and stability assessment of the hip were performed to confirm optimal component selection and stability in all planes without impingement when stressed to the extremes.     The wound was irrigated with dilute betadine solution as well as saline, and hemostasis obtained with electrocautery. A pain cocktail was injected into the pericapsular tissues. The posterior capsule, piriformis, and short external rotators were repaired utilizing #2 Ticron through drill holes in the greater trochanter. The sciatic nerve was palpated and found to be intact and the wound was irrigated. Instrument and sponge counts were completed and confirmed correct. The fascia macho and gluteal fascia were closed with interrupted #1 Vicryl suture and oversewn with #2 Stratafix. The deep subcutaneous tissue was closed with running #1 Stratafix suture and the superficial subcutaneous tissue with interrupted 2-0 Vicryl suture. A 3-0 monocryl running stitch was used to close skin followed by skin glue adhesive to seal the wound. A silver impregnated dressing was then placed, followed by ALPHONSO hose and a sequential compression device to the operative limb, followed by an abduction pillow. The patient was then returned to a supine position on the operating room table. The  patient was sufficiently recovered from anesthesia, transferred to a hospital bed and taken to the PACU in stable condition.     One gram (1000 mg) of intravenous tranexamic acid was administered prior to incision. A second one gram (1000 mg) intravenous dose was given prior to wound closure.    No apparent complications occurred during the procedure Instrument, sponge and needle counts were correct x 2.     The patient underwent risk stratification preoperatively and aspirin was chosen for DVT prophylaxis. Delay in starting chemical prophylaxis for 23 hours from surgical incision was over concerns for hematoma formation and wound related issues.     POST OPERATIVE PLAN:   Protected weight bearing as tolerated   Posterior hip precautions x 6 weeks   PT/OT for mobilization and medical equipment needs   23 hours perioperative antibiotic prophylaxis   Pain control with PO/IV meds   Keep silver dressing in place for 7 days post op. Change dressing only if saturated.   ALPHONSO hose and SCD's to bilateral lower extremities   Social work for discharge planning needs   Follow up in 3 weeks for post operative wound check with XR AP pelvis.

## 2023-08-31 ENCOUNTER — OFFICE VISIT (OUTPATIENT)
Dept: FAMILY MEDICINE CLINIC | Facility: CLINIC | Age: 58
End: 2023-08-31
Payer: COMMERCIAL

## 2023-08-31 VITALS
OXYGEN SATURATION: 98 % | TEMPERATURE: 97.3 F | SYSTOLIC BLOOD PRESSURE: 120 MMHG | DIASTOLIC BLOOD PRESSURE: 78 MMHG | HEART RATE: 59 BPM | BODY MASS INDEX: 26.01 KG/M2 | RESPIRATION RATE: 20 BRPM | WEIGHT: 185.8 LBS | HEIGHT: 71 IN

## 2023-08-31 DIAGNOSIS — R00.2 PALPITATIONS: Primary | ICD-10-CM

## 2023-08-31 DIAGNOSIS — Z82.49 FAMILY HISTORY OF ATRIAL FIBRILLATION: ICD-10-CM

## 2023-08-31 PROCEDURE — 99204 OFFICE O/P NEW MOD 45 MIN: CPT | Performed by: FAMILY MEDICINE

## 2023-08-31 RX ORDER — SILDENAFIL 100 MG/1
100 TABLET, FILM COATED ORAL DAILY PRN
COMMUNITY

## 2023-08-31 NOTE — PROGRESS NOTES
"Chief Complaint   Patient presents with    NP / establish PCP     Palpitations     Pt has worn a holter monitor in the past / did have an episode yesterday.        Subjective      Kameron Khan is a 58 y.o. who presents for palpitations.  This is not the first time patient has ever experienced palpitations.  His previous PCP also evaluated him in the distant past.  Patient will report episodes of irregular heartbeat.  Generally these episodes are very benign lasting only a couple of minutes.  Approximately 6 months ago he had an episode that lasted nearly 30 minutes which she describes as a heart beat that was most fast and irregular.  Patient describes wearing a 24-hour Holter monitor.    The following portions of the patient's history were reviewed and updated as appropriate: allergies, current medications, past family history, past medical history, past social history, past surgical history, and problem list.    Review of Systems    Objective   Vital Signs:  /78   Pulse 59   Temp 97.3 øF (36.3 øC)   Resp 20   Ht 180.3 cm (71\")   Wt 84.3 kg (185 lb 12.8 oz)   SpO2 98%   BMI 25.91 kg/mý     BMI is >= 25 and <30. (Overweight) The following options were offered after discussion;: exercise counseling/recommendations        Physical Exam  Vitals reviewed.   Constitutional:       Appearance: Normal appearance.   Cardiovascular:      Rate and Rhythm: Normal rate and regular rhythm.      Pulses: Normal pulses.      Heart sounds: Normal heart sounds.   Pulmonary:      Effort: Pulmonary effort is normal.      Breath sounds: Normal breath sounds.   Musculoskeletal:      Cervical back: Neck supple. No rigidity.   Lymphadenopathy:      Cervical: No cervical adenopathy.   Neurological:      Mental Status: He is alert.        Result Review                     Assessment and Plan  Diagnoses and all orders for this visit:    1. Palpitations (Primary)  -     CBC & Differential  -     TSH Rfx On Abnormal To " Free T4  -     Holter Monitor - 72 Hour Up To 15 Days; Future    2. Family history of atrial fibrillation  -     Holter Monitor - 72 Hour Up To 15 Days; Future    Plan:  1.  CBC and thyroid testing to further assess palpitations  2.  Proceed with extended Holter monitor.  If Holter monitor is unrevealing echocardiogram will be performed to assess valve status        Follow Up  No follow-ups on file.  Patient was given instructions and counseling regarding his condition or for health maintenance advice. Please see specific information pulled into the AVS if appropriate.

## 2023-09-01 LAB
BASOPHILS # BLD AUTO: 0.03 10*3/MM3 (ref 0–0.2)
BASOPHILS NFR BLD AUTO: 0.7 % (ref 0–1.5)
EOSINOPHIL # BLD AUTO: 0.12 10*3/MM3 (ref 0–0.4)
EOSINOPHIL NFR BLD AUTO: 2.7 % (ref 0.3–6.2)
ERYTHROCYTE [DISTWIDTH] IN BLOOD BY AUTOMATED COUNT: 12.3 % (ref 12.3–15.4)
HCT VFR BLD AUTO: 48.5 % (ref 37.5–51)
HGB BLD-MCNC: 16.5 G/DL (ref 13–17.7)
IMM GRANULOCYTES # BLD AUTO: 0 10*3/MM3 (ref 0–0.05)
IMM GRANULOCYTES NFR BLD AUTO: 0 % (ref 0–0.5)
LYMPHOCYTES # BLD AUTO: 1.62 10*3/MM3 (ref 0.7–3.1)
LYMPHOCYTES NFR BLD AUTO: 36.7 % (ref 19.6–45.3)
MCH RBC QN AUTO: 30.8 PG (ref 26.6–33)
MCHC RBC AUTO-ENTMCNC: 34 G/DL (ref 31.5–35.7)
MCV RBC AUTO: 90.5 FL (ref 79–97)
MONOCYTES # BLD AUTO: 0.36 10*3/MM3 (ref 0.1–0.9)
MONOCYTES NFR BLD AUTO: 8.2 % (ref 5–12)
NEUTROPHILS # BLD AUTO: 2.28 10*3/MM3 (ref 1.7–7)
NEUTROPHILS NFR BLD AUTO: 51.7 % (ref 42.7–76)
NRBC BLD AUTO-RTO: 0 /100 WBC (ref 0–0.2)
PLATELET # BLD AUTO: 250 10*3/MM3 (ref 140–450)
RBC # BLD AUTO: 5.36 10*6/MM3 (ref 4.14–5.8)
TSH SERPL DL<=0.005 MIU/L-ACNC: 0.71 UIU/ML (ref 0.27–4.2)
WBC # BLD AUTO: 4.41 10*3/MM3 (ref 3.4–10.8)

## 2023-09-07 ENCOUNTER — PATIENT ROUNDING (BHMG ONLY) (OUTPATIENT)
Dept: FAMILY MEDICINE CLINIC | Facility: CLINIC | Age: 58
End: 2023-09-07
Payer: COMMERCIAL

## 2023-09-07 ENCOUNTER — TELEPHONE (OUTPATIENT)
Dept: ORTHOPEDIC SURGERY | Facility: CLINIC | Age: 58
End: 2023-09-07
Payer: COMMERCIAL

## 2023-09-07 NOTE — PROGRESS NOTES
A My-Chart [place] has been [place] to the patient [place] [facility] [place]

## 2023-09-07 NOTE — TELEPHONE ENCOUNTER
Patient unable to get Bondi 7 [employer]oka shoes any more and is asking if Dr. Fields has a recommendation of what type of shoe to get for Metatarsalgia.     Please advise  Kameron - [phone]

## 2023-09-08 NOTE — TELEPHONE ENCOUNTER
Left message for patient letting him know what Dr. Toussaint said and to call with any further questions or concerns he may have.  Perlita Lama RT (R), ROT

## 2023-09-08 NOTE — TELEPHONE ENCOUNTER
"I recommend he go to [employer], or [employer] to see what works for her.  I like Zoila Johnson, but there is no \"one shoe\" that works for everyone.  LTD     "

## 2023-09-22 ENCOUNTER — TELEPHONE (OUTPATIENT)
Dept: FAMILY MEDICINE CLINIC | Facility: CLINIC | Age: 58
End: 2023-09-22

## 2023-09-22 NOTE — TELEPHONE ENCOUNTER
Central Scheduling called to let us know they've scheduling this patient to get a heart monitor on Sept. 26th at 9am

## 2023-10-16 ENCOUNTER — TELEPHONE (OUTPATIENT)
Dept: FAMILY MEDICINE CLINIC | Facility: CLINIC | Age: 58
End: 2023-10-16
Payer: COMMERCIAL

## 2023-10-16 RX ORDER — PRAVASTATIN SODIUM 80 MG/1
80 TABLET ORAL DAILY
Qty: 30 TABLET | Refills: 11 | Status: SHIPPED | OUTPATIENT
Start: 2023-10-16

## 2023-10-16 NOTE — TELEPHONE ENCOUNTER
Caller: Delmeroar Kameron Sam    Relationship: Self    Best call back number: [phone]    Requested Prescriptions:   Requested Prescriptions      No prescriptions requested or ordered in this encounter        Pharmacy where request should be sent:    [facility] [phone]  Last office visit with prescribing clinician: 8/31/2023   Last telemedicine visit with prescribing clinician: Visit date not found   Next office visit with prescribing clinician: Visit date not found     Additional details provided by patient: PRAVASTATIN 80MG TAKE 1 TAB MY MOUTH DAILY. PATIENT TAKES A 1/2 TABLET DAILY WHICH HELPS HIM WITH COST. PATIENT IS OUT OF MEDICATION    Does the patient have less than a 3 day supply:  [x] Yes  [] No    Would you like a call back once the refill request has been completed: [x] Yes [] No    If the office needs to give you a call back, can they leave a voicemail: [x] Yes [] No    August Ball Rep   10/16/23 11:45 EDT

## 2023-12-22 ENCOUNTER — PATIENT MESSAGE (OUTPATIENT)
Dept: FAMILY MEDICINE CLINIC | Facility: CLINIC | Age: 58
End: 2023-12-22
Payer: COMMERCIAL

## 2023-12-22 DIAGNOSIS — G47.00 INSOMNIA, UNSPECIFIED TYPE: Primary | ICD-10-CM

## 2023-12-22 NOTE — TELEPHONE ENCOUNTER
From: Kameron Khan  To: Eddi Asif  Sent: 12/22/2023 10:55 AM EST  Subject: Prescription refill    Dr. Asif,    Would you mind writing a new prescription for me for Amitriptyline HCL 25 mg tab, take one half tablet by mouth at bedtime as needed. Dr. Moraes most recently wrote this med and no refills remain. My pharmacy is [facility], [phone].     Thank you and Lindy Howell!    Fito Dowell  [phone]

## 2023-12-26 RX ORDER — AMITRIPTYLINE HYDROCHLORIDE 10 MG/1
5 TABLET, FILM COATED ORAL NIGHTLY PRN
Qty: 15 TABLET | Refills: 8 | Status: SHIPPED | OUTPATIENT
Start: 2023-12-26

## 2024-07-17 ENCOUNTER — TELEPHONE (OUTPATIENT)
Dept: FAMILY MEDICINE CLINIC | Facility: CLINIC | Age: 59
End: 2024-07-17
Payer: COMMERCIAL

## 2024-07-17 RX ORDER — SILDENAFIL CITRATE 20 MG/1
100 TABLET ORAL DAILY PRN
Qty: 90 TABLET | Refills: 1 | Status: SHIPPED | OUTPATIENT
Start: 2024-07-17

## 2024-07-17 NOTE — TELEPHONE ENCOUNTER
Caller: Kameron Khan    Relationship: Self    Best call back number: [phone]     What medication are you requesting: sildenafil (VIAGRA) 20 MG tablet  THIS IS SUPPOSED TO TAKE 2-5 TABLETS BY MOUTH AS NEEDED    90 QUANTITY    What are your current symptoms: ERECTILE DYSFUNCTION    Have you had these symptoms before:    [x] Yes  [] No    Have you been treated for these symptoms before:   [x] Yes  [] No    If a prescription is needed, what is your preferred pharmacy and phone number: [address] [phone] [facility] [phone] [facility]     Additional notes: THIS WAS ORIGINALLY PRESCRIBED BY HIS PREVIOUS PCP BUT SINCE HE JUST CHANGED OVER TO  DR. BRUNO THIS IS ONE THAT NEEDS TO BE PRESCRIBED UNDER HIM NOW. PATIENT READ THE DOSE AND DIRECTIONS OFF OF THE BOTTLE HE HAD LAST BEEN PRESCRIBED WHICH IS DIFFERENT FROM WHAT IS ON MED LIST. PLEASE SEND ASAP SINCE HE IS OUT OF MEDICATION AND LET HIM KNOW IF ANY ISSUES WITH THIS REQUEST.

## 2024-08-26 DIAGNOSIS — G47.00 INSOMNIA, UNSPECIFIED TYPE: ICD-10-CM

## 2024-08-27 RX ORDER — AMITRIPTYLINE HYDROCHLORIDE 10 MG/1
5 TABLET ORAL NIGHTLY PRN
Qty: 15 TABLET | Refills: 8 | Status: SHIPPED | OUTPATIENT
Start: 2024-08-27

## 2024-09-05 ENCOUNTER — OFFICE VISIT (OUTPATIENT)
Dept: FAMILY MEDICINE CLINIC | Facility: CLINIC | Age: 59
End: 2024-09-05
Payer: COMMERCIAL

## 2024-09-05 VITALS
OXYGEN SATURATION: 99 % | SYSTOLIC BLOOD PRESSURE: 110 MMHG | WEIGHT: 188.8 LBS | TEMPERATURE: 97.5 F | HEIGHT: 71 IN | HEART RATE: 70 BPM | BODY MASS INDEX: 26.43 KG/M2 | RESPIRATION RATE: 20 BRPM | DIASTOLIC BLOOD PRESSURE: 80 MMHG

## 2024-09-05 DIAGNOSIS — Z13.1 SCREENING FOR DIABETES MELLITUS: ICD-10-CM

## 2024-09-05 DIAGNOSIS — Z00.00 ANNUAL PHYSICAL EXAM: Primary | ICD-10-CM

## 2024-09-05 DIAGNOSIS — E78.5 HYPERLIPIDEMIA, UNSPECIFIED HYPERLIPIDEMIA TYPE: Chronic | ICD-10-CM

## 2024-09-05 PROCEDURE — 99396 PREV VISIT EST AGE 40-64: CPT | Performed by: FAMILY MEDICINE

## 2024-09-05 RX ORDER — FINASTERIDE 5 MG/1
5 TABLET, FILM COATED ORAL DAILY
Qty: 30 TABLET | Refills: 11 | Status: SHIPPED | OUTPATIENT
Start: 2024-09-05

## 2024-09-05 RX ORDER — GLUCOSAM/CHONDRO/HERB 149/HYAL 750-100 MG
TABLET ORAL
COMMUNITY

## 2024-09-05 RX ORDER — PRAVASTATIN SODIUM 80 MG/1
80 TABLET ORAL DAILY
Qty: 30 TABLET | Refills: 11 | Status: SHIPPED | OUTPATIENT
Start: 2024-09-05

## 2024-09-05 RX ORDER — CHOLECALCIFEROL (VITAMIN D3) 125 MCG
CAPSULE ORAL
COMMUNITY

## 2024-09-05 RX ORDER — CHLORAL HYDRATE 500 MG
CAPSULE ORAL
COMMUNITY

## 2024-09-05 NOTE — PROGRESS NOTES
"Chief Complaint   Patient presents with    Annual Exam       Subjective      Kameron Khan is a 59 y.o. who presents for Annual Physical.     Sleep. 8 hours per night.     Physical Activity. AT and RT 5-6 days per week.     Diet. Lean proteins. Protein powder. Fruits and veggies. Rare soft drinks.     The following portions of the patient's history were reviewed and updated as appropriate: allergies, current medications, past family history, past medical history, past social history, past surgical history, and problem list.    Review of Systems   Cardiovascular:  Positive for palpitations.   All other systems reviewed and are negative.      Objective   Vital Signs:  /80   Pulse 70   Temp 97.5 °F (36.4 °C)   Resp 20   Ht 180.3 cm (71\")   Wt 85.6 kg (188 lb 12.8 oz)   SpO2 99%   BMI 26.33 kg/m²               Physical Exam  Constitutional:       General: He is not in acute distress.     Appearance: Normal appearance. He is not ill-appearing.   HENT:      Head: Normocephalic and atraumatic.      Right Ear: Tympanic membrane and ear canal normal.      Left Ear: Tympanic membrane and ear canal normal.      Nose: Nose normal.      Mouth/Throat:      Mouth: Mucous membranes are moist.      Pharynx: Oropharynx is clear. No posterior oropharyngeal erythema.   Eyes:      Extraocular Movements: Extraocular movements intact.      Conjunctiva/sclera: Conjunctivae normal.      Pupils: Pupils are equal, round, and reactive to light.   Cardiovascular:      Rate and Rhythm: Normal rate and regular rhythm.      Pulses: Normal pulses.      Heart sounds: Normal heart sounds.   Pulmonary:      Effort: Pulmonary effort is normal. No respiratory distress.      Breath sounds: Normal breath sounds.   Abdominal:      General: Abdomen is flat. Bowel sounds are normal.      Palpations: Abdomen is soft.      Tenderness: There is no abdominal tenderness.   Musculoskeletal:         General: Normal range of motion.      " Cervical back: Normal range of motion and neck supple.   Lymphadenopathy:      Cervical: No cervical adenopathy.   Skin:     General: Skin is warm and dry.      Capillary Refill: Capillary refill takes less than 2 seconds.   Neurological:      General: No focal deficit present.      Mental Status: He is alert and oriented to person, place, and time. Mental status is at baseline.      Cranial Nerves: No cranial nerve deficit.      Sensory: No sensory deficit.      Motor: No weakness.   Psychiatric:         Mood and Affect: Mood normal.         Behavior: Behavior normal.         Thought Content: Thought content normal.         Judgment: Judgment normal.          Result Review                     Assessment and Plan  Diagnoses and all orders for this visit:    1. Annual physical exam (Primary)    2. Hyperlipidemia, unspecified hyperlipidemia type  -     Lipid Panel  -     pravastatin (PRAVACHOL) 80 MG tablet; Take 1 tablet by mouth Daily.  Dispense: 30 tablet; Refill: 11    3. Screening for diabetes mellitus  -     Glucose, Random    Other orders  -     finasteride (PROSCAR) 5 MG tablet; Take 1 tablet by mouth Daily.  Dispense: 30 tablet; Refill: 11    Plan  1.  Patient has improved his physical health over the last year with increased physical activity.  Congratulated on changes.  Continue regular aerobic and resistance training.  Patient should also continue his current diet  2.  Patient was made aware of available vaccines including updated flu vaccine and COVID vaccination.  3.  Screening blood sugar ordered  4.  Surveillance lipid panel ordered.  Continue pravastatin.  5.  Continue regular eye examinations  6.  Establish care with a dentist        Follow Up  No follow-ups on file.  Patient was given instructions and counseling regarding his condition or for health maintenance advice. Please see specific information pulled into the AVS if appropriate.

## 2024-09-10 PROBLEM — M19.90 INFLAMMATORY ARTHRITIS: Status: ACTIVE | Noted: 2024-09-10

## 2024-09-10 PROBLEM — M19.049 OSTEOARTHRITIS OF FINGER: Status: ACTIVE | Noted: 2024-09-10

## 2024-09-16 ENCOUNTER — LAB (OUTPATIENT)
Dept: FAMILY MEDICINE CLINIC | Facility: CLINIC | Age: 59
End: 2024-09-16
Payer: COMMERCIAL

## 2024-09-16 LAB
CHOLEST SERPL-MCNC: 208 MG/DL (ref 0–200)
GLUCOSE SERPL-MCNC: 75 MG/DL (ref 65–99)
HDLC SERPL-MCNC: 72 MG/DL (ref 40–60)
LDLC SERPL CALC-MCNC: 126 MG/DL (ref 0–100)
TRIGL SERPL-MCNC: 57 MG/DL (ref 0–150)
VLDLC SERPL CALC-MCNC: 10 MG/DL (ref 5–40)

## 2024-09-17 PROBLEM — M25.512 CHRONIC PAIN OF BOTH SHOULDERS: Status: ACTIVE | Noted: 2024-09-17

## 2024-09-17 PROBLEM — Z79.1 LONG TERM (CURRENT) USE OF NON-STEROIDAL ANTI-INFLAMMATORIES (NSAID): Status: ACTIVE | Noted: 2024-09-17

## 2024-09-17 PROBLEM — M19.042 PRIMARY OSTEOARTHRITIS OF BOTH HANDS: Status: ACTIVE | Noted: 2024-09-17

## 2024-09-17 PROBLEM — M19.041 PRIMARY OSTEOARTHRITIS OF BOTH HANDS: Status: ACTIVE | Noted: 2024-09-17

## 2024-09-17 PROBLEM — M16.11 PRIMARY OSTEOARTHRITIS OF RIGHT HIP: Status: ACTIVE | Noted: 2024-09-17

## 2024-09-17 PROBLEM — M25.511 CHRONIC PAIN OF BOTH SHOULDERS: Status: ACTIVE | Noted: 2024-09-17

## 2024-09-17 PROBLEM — Z96.642 STATUS POST LEFT HIP REPLACEMENT: Status: ACTIVE | Noted: 2024-09-17

## 2024-09-17 PROBLEM — G89.29 CHRONIC PAIN OF BOTH SHOULDERS: Status: ACTIVE | Noted: 2024-09-17

## 2024-09-18 ENCOUNTER — OFFICE VISIT (OUTPATIENT)
Age: 59
End: 2024-09-18
Payer: COMMERCIAL

## 2024-09-18 ENCOUNTER — LAB (OUTPATIENT)
Facility: HOSPITAL | Age: 59
End: 2024-09-18
Payer: COMMERCIAL

## 2024-09-18 VITALS
DIASTOLIC BLOOD PRESSURE: 70 MMHG | TEMPERATURE: 98.3 F | HEIGHT: 71 IN | SYSTOLIC BLOOD PRESSURE: 110 MMHG | HEART RATE: 70 BPM | BODY MASS INDEX: 26.39 KG/M2 | WEIGHT: 188.5 LBS

## 2024-09-18 DIAGNOSIS — M16.11 PRIMARY OSTEOARTHRITIS OF RIGHT HIP: ICD-10-CM

## 2024-09-18 DIAGNOSIS — Z79.1 LONG TERM (CURRENT) USE OF NON-STEROIDAL ANTI-INFLAMMATORIES (NSAID): ICD-10-CM

## 2024-09-18 DIAGNOSIS — M25.511 CHRONIC PAIN OF BOTH SHOULDERS: ICD-10-CM

## 2024-09-18 DIAGNOSIS — M19.042 PRIMARY OSTEOARTHRITIS OF BOTH HANDS: ICD-10-CM

## 2024-09-18 DIAGNOSIS — M19.90 INFLAMMATORY ARTHRITIS: Primary | ICD-10-CM

## 2024-09-18 DIAGNOSIS — Z96.642 STATUS POST LEFT HIP REPLACEMENT: ICD-10-CM

## 2024-09-18 DIAGNOSIS — M25.512 CHRONIC PAIN OF BOTH SHOULDERS: ICD-10-CM

## 2024-09-18 DIAGNOSIS — M19.90 INFLAMMATORY ARTHRITIS: ICD-10-CM

## 2024-09-18 DIAGNOSIS — G89.29 CHRONIC PAIN OF BOTH SHOULDERS: ICD-10-CM

## 2024-09-18 DIAGNOSIS — M19.041 PRIMARY OSTEOARTHRITIS OF BOTH HANDS: ICD-10-CM

## 2024-09-18 DIAGNOSIS — G47.00 INSOMNIA, UNSPECIFIED TYPE: ICD-10-CM

## 2024-09-18 PROCEDURE — 85652 RBC SED RATE AUTOMATED: CPT

## 2024-09-18 PROCEDURE — 36415 COLL VENOUS BLD VENIPUNCTURE: CPT

## 2024-09-18 PROCEDURE — 80053 COMPREHEN METABOLIC PANEL: CPT

## 2024-09-18 PROCEDURE — 86140 C-REACTIVE PROTEIN: CPT

## 2024-09-18 PROCEDURE — 99214 OFFICE O/P EST MOD 30 MIN: CPT | Performed by: INTERNAL MEDICINE

## 2024-09-18 PROCEDURE — 85025 COMPLETE CBC W/AUTO DIFF WBC: CPT

## 2024-09-18 RX ORDER — CELECOXIB 200 MG/1
200 CAPSULE ORAL DAILY PRN
Qty: 90 CAPSULE | Refills: 1 | Status: SHIPPED | OUTPATIENT
Start: 2024-09-18

## 2024-09-18 RX ORDER — AMITRIPTYLINE HYDROCHLORIDE 10 MG/1
5 TABLET ORAL NIGHTLY PRN
Qty: 45 TABLET | Refills: 1 | Status: SHIPPED | OUTPATIENT
Start: 2024-09-18

## 2024-09-19 LAB
ALBUMIN SERPL-MCNC: 4.4 G/DL (ref 3.5–5.2)
ALBUMIN/GLOB SERPL: 1.8 G/DL
ALP SERPL-CCNC: 41 U/L (ref 39–117)
ALT SERPL W P-5'-P-CCNC: 31 U/L (ref 1–41)
ANION GAP SERPL CALCULATED.3IONS-SCNC: 9.9 MMOL/L (ref 5–15)
AST SERPL-CCNC: 25 U/L (ref 1–40)
BASOPHILS # BLD AUTO: 0.05 10*3/MM3 (ref 0–0.2)
BASOPHILS NFR BLD AUTO: 0.8 % (ref 0–1.5)
BILIRUB SERPL-MCNC: 0.5 MG/DL (ref 0–1.2)
BUN SERPL-MCNC: 21 MG/DL (ref 6–20)
BUN/CREAT SERPL: 25.6 (ref 7–25)
CALCIUM SPEC-SCNC: 9.7 MG/DL (ref 8.6–10.5)
CHLORIDE SERPL-SCNC: 103 MMOL/L (ref 98–107)
CO2 SERPL-SCNC: 28.1 MMOL/L (ref 22–29)
CREAT SERPL-MCNC: 0.82 MG/DL (ref 0.76–1.27)
CRP SERPL-MCNC: <0.3 MG/DL (ref 0–0.5)
DEPRECATED RDW RBC AUTO: 40.4 FL (ref 37–54)
EGFRCR SERPLBLD CKD-EPI 2021: 101.2 ML/MIN/1.73
EOSINOPHIL # BLD AUTO: 0.14 10*3/MM3 (ref 0–0.4)
EOSINOPHIL NFR BLD AUTO: 2.1 % (ref 0.3–6.2)
ERYTHROCYTE [DISTWIDTH] IN BLOOD BY AUTOMATED COUNT: 12.2 % (ref 12.3–15.4)
ERYTHROCYTE [SEDIMENTATION RATE] IN BLOOD: 2 MM/HR (ref 0–20)
GLOBULIN UR ELPH-MCNC: 2.5 GM/DL
GLUCOSE SERPL-MCNC: 78 MG/DL (ref 65–99)
HCT VFR BLD AUTO: 45.8 % (ref 37.5–51)
HGB BLD-MCNC: 15.5 G/DL (ref 13–17.7)
IMM GRANULOCYTES # BLD AUTO: 0.02 10*3/MM3 (ref 0–0.05)
IMM GRANULOCYTES NFR BLD AUTO: 0.3 % (ref 0–0.5)
LYMPHOCYTES # BLD AUTO: 1.59 10*3/MM3 (ref 0.7–3.1)
LYMPHOCYTES NFR BLD AUTO: 23.9 % (ref 19.6–45.3)
MCH RBC QN AUTO: 30.8 PG (ref 26.6–33)
MCHC RBC AUTO-ENTMCNC: 33.8 G/DL (ref 31.5–35.7)
MCV RBC AUTO: 90.9 FL (ref 79–97)
MONOCYTES # BLD AUTO: 0.41 10*3/MM3 (ref 0.1–0.9)
MONOCYTES NFR BLD AUTO: 6.2 % (ref 5–12)
NEUTROPHILS NFR BLD AUTO: 4.45 10*3/MM3 (ref 1.7–7)
NEUTROPHILS NFR BLD AUTO: 66.7 % (ref 42.7–76)
NRBC BLD AUTO-RTO: 0 /100 WBC (ref 0–0.2)
PLATELET # BLD AUTO: 262 10*3/MM3 (ref 140–450)
PMV BLD AUTO: 11 FL (ref 6–12)
POTASSIUM SERPL-SCNC: 5 MMOL/L (ref 3.5–5.2)
PROT SERPL-MCNC: 6.9 G/DL (ref 6–8.5)
RBC # BLD AUTO: 5.04 10*6/MM3 (ref 4.14–5.8)
SODIUM SERPL-SCNC: 141 MMOL/L (ref 136–145)
WBC NRBC COR # BLD AUTO: 6.66 10*3/MM3 (ref 3.4–10.8)

## 2024-09-24 ENCOUNTER — PATIENT MESSAGE (OUTPATIENT)
Dept: FAMILY MEDICINE CLINIC | Facility: CLINIC | Age: 59
End: 2024-09-24
Payer: COMMERCIAL

## 2024-10-21 ENCOUNTER — PATIENT MESSAGE (OUTPATIENT)
Dept: FAMILY MEDICINE CLINIC | Facility: CLINIC | Age: 59
End: 2024-10-21
Payer: COMMERCIAL

## 2024-10-21 DIAGNOSIS — N52.9 ERECTILE DYSFUNCTION, UNSPECIFIED ERECTILE DYSFUNCTION TYPE: Primary | ICD-10-CM

## 2024-10-21 RX ORDER — TADALAFIL 20 MG/1
20 TABLET ORAL DAILY PRN
Qty: 6 TABLET | Refills: 5 | Status: SHIPPED | OUTPATIENT
Start: 2024-10-21

## 2025-01-15 ENCOUNTER — PATIENT MESSAGE (OUTPATIENT)
Dept: FAMILY MEDICINE CLINIC | Facility: CLINIC | Age: 60
End: 2025-01-15
Payer: COMMERCIAL

## 2025-01-15 DIAGNOSIS — N52.9 ERECTILE DYSFUNCTION, UNSPECIFIED ERECTILE DYSFUNCTION TYPE: Primary | ICD-10-CM

## 2025-01-16 RX ORDER — TADALAFIL 5 MG/1
5 TABLET ORAL DAILY
Qty: 30 TABLET | Refills: 11 | Status: SHIPPED | OUTPATIENT
Start: 2025-01-16

## 2025-03-18 ENCOUNTER — OFFICE VISIT (OUTPATIENT)
Age: 60
End: 2025-03-18
Payer: COMMERCIAL

## 2025-03-18 ENCOUNTER — LAB (OUTPATIENT)
Facility: HOSPITAL | Age: 60
End: 2025-03-18
Payer: COMMERCIAL

## 2025-03-18 VITALS
HEIGHT: 71 IN | BODY MASS INDEX: 26.88 KG/M2 | WEIGHT: 192 LBS | HEART RATE: 70 BPM | SYSTOLIC BLOOD PRESSURE: 110 MMHG | DIASTOLIC BLOOD PRESSURE: 80 MMHG | TEMPERATURE: 97.2 F

## 2025-03-18 DIAGNOSIS — M19.90 INFLAMMATORY ARTHRITIS: Primary | ICD-10-CM

## 2025-03-18 DIAGNOSIS — M25.511 CHRONIC PAIN OF BOTH SHOULDERS: ICD-10-CM

## 2025-03-18 DIAGNOSIS — M19.042 PRIMARY OSTEOARTHRITIS OF BOTH HANDS: ICD-10-CM

## 2025-03-18 DIAGNOSIS — G89.29 CHRONIC PAIN OF BOTH SHOULDERS: ICD-10-CM

## 2025-03-18 DIAGNOSIS — M25.512 CHRONIC PAIN OF BOTH SHOULDERS: ICD-10-CM

## 2025-03-18 DIAGNOSIS — M16.11 PRIMARY OSTEOARTHRITIS OF RIGHT HIP: ICD-10-CM

## 2025-03-18 DIAGNOSIS — M19.90 INFLAMMATORY ARTHRITIS: ICD-10-CM

## 2025-03-18 DIAGNOSIS — Z96.642 STATUS POST LEFT HIP REPLACEMENT: ICD-10-CM

## 2025-03-18 DIAGNOSIS — M19.041 PRIMARY OSTEOARTHRITIS OF BOTH HANDS: ICD-10-CM

## 2025-03-18 DIAGNOSIS — Z79.1 LONG TERM (CURRENT) USE OF NON-STEROIDAL ANTI-INFLAMMATORIES (NSAID): ICD-10-CM

## 2025-03-18 LAB
ALBUMIN SERPL-MCNC: 4.6 G/DL (ref 3.5–5.2)
ALBUMIN/GLOB SERPL: 1.7 G/DL
ALP SERPL-CCNC: 53 U/L (ref 39–117)
ALT SERPL W P-5'-P-CCNC: 26 U/L (ref 1–41)
ANION GAP SERPL CALCULATED.3IONS-SCNC: 11 MMOL/L (ref 5–15)
AST SERPL-CCNC: 26 U/L (ref 1–40)
BASOPHILS # BLD AUTO: 0.05 10*3/MM3 (ref 0–0.2)
BASOPHILS NFR BLD AUTO: 0.8 % (ref 0–1.5)
BILIRUB SERPL-MCNC: 0.4 MG/DL (ref 0–1.2)
BUN SERPL-MCNC: 22 MG/DL (ref 6–20)
BUN/CREAT SERPL: 22 (ref 7–25)
CALCIUM SPEC-SCNC: 9.2 MG/DL (ref 8.6–10.5)
CHLORIDE SERPL-SCNC: 103 MMOL/L (ref 98–107)
CO2 SERPL-SCNC: 26 MMOL/L (ref 22–29)
CREAT SERPL-MCNC: 1 MG/DL (ref 0.76–1.27)
DEPRECATED RDW RBC AUTO: 40 FL (ref 37–54)
EGFRCR SERPLBLD CKD-EPI 2021: 86.7 ML/MIN/1.73
EOSINOPHIL # BLD AUTO: 0.11 10*3/MM3 (ref 0–0.4)
EOSINOPHIL NFR BLD AUTO: 1.7 % (ref 0.3–6.2)
ERYTHROCYTE [DISTWIDTH] IN BLOOD BY AUTOMATED COUNT: 12.2 % (ref 12.3–15.4)
GLOBULIN UR ELPH-MCNC: 2.7 GM/DL
GLUCOSE SERPL-MCNC: 81 MG/DL (ref 65–99)
HCT VFR BLD AUTO: 46.5 % (ref 37.5–51)
HGB BLD-MCNC: 15.8 G/DL (ref 13–17.7)
IMM GRANULOCYTES # BLD AUTO: 0.01 10*3/MM3 (ref 0–0.05)
IMM GRANULOCYTES NFR BLD AUTO: 0.2 % (ref 0–0.5)
LYMPHOCYTES # BLD AUTO: 1.97 10*3/MM3 (ref 0.7–3.1)
LYMPHOCYTES NFR BLD AUTO: 30.4 % (ref 19.6–45.3)
MCH RBC QN AUTO: 30.4 PG (ref 26.6–33)
MCHC RBC AUTO-ENTMCNC: 34 G/DL (ref 31.5–35.7)
MCV RBC AUTO: 89.4 FL (ref 79–97)
MONOCYTES # BLD AUTO: 0.46 10*3/MM3 (ref 0.1–0.9)
MONOCYTES NFR BLD AUTO: 7.1 % (ref 5–12)
NEUTROPHILS NFR BLD AUTO: 3.88 10*3/MM3 (ref 1.7–7)
NEUTROPHILS NFR BLD AUTO: 59.8 % (ref 42.7–76)
NRBC BLD AUTO-RTO: 0 /100 WBC (ref 0–0.2)
PLATELET # BLD AUTO: 257 10*3/MM3 (ref 140–450)
PMV BLD AUTO: 11.1 FL (ref 6–12)
POTASSIUM SERPL-SCNC: 4.5 MMOL/L (ref 3.5–5.2)
PROT SERPL-MCNC: 7.3 G/DL (ref 6–8.5)
RBC # BLD AUTO: 5.2 10*6/MM3 (ref 4.14–5.8)
SODIUM SERPL-SCNC: 140 MMOL/L (ref 136–145)
WBC NRBC COR # BLD AUTO: 6.48 10*3/MM3 (ref 3.4–10.8)

## 2025-03-18 PROCEDURE — 80053 COMPREHEN METABOLIC PANEL: CPT

## 2025-03-18 PROCEDURE — 85025 COMPLETE CBC W/AUTO DIFF WBC: CPT

## 2025-03-18 PROCEDURE — 36415 COLL VENOUS BLD VENIPUNCTURE: CPT

## 2025-03-18 PROCEDURE — 99214 OFFICE O/P EST MOD 30 MIN: CPT | Performed by: INTERNAL MEDICINE

## 2025-03-18 RX ORDER — CELECOXIB 200 MG/1
200 CAPSULE ORAL DAILY PRN
Qty: 90 CAPSULE | Refills: 1 | Status: SHIPPED | OUTPATIENT
Start: 2025-03-18

## 2025-03-18 NOTE — ASSESSMENT & PLAN NOTE
BASIS OF DIAGNOSIS NOT CLEAR. NO REPORTS OF SYNOVITIS IN OLD RECORDS. RF, CCP, NILE, AND HLAB27 ARE ALL NEGATIVE.  * Former patient of Dr. Knox-diagnosis 2001.No DMARDs  * Medications tried include: Celebrex, Salsalate, naproxen.  Has history of negative SI MRI in past..    Doing very well.  No evidence of active IA today.   Shoulders are better since PT and with exercise.   Pt global is 2/10 and VAS 1/10.  He will continue physical therapy exercises and weight lifting for the shoulders.    Continue Celebrex prn.   Labs today.   I will see him in follow-up in 6 months.

## 2025-03-18 NOTE — PROGRESS NOTES
Office Follow Up      Date: 03/18/2025   Patient Name: Kameron Khan  MRN: [id number]  YOB: 1965    Referring Physician: No ref. provider found     Chief Complaint: Joint pain      History of Present Illness: Kameron Khan is a 59 y.o. male who is here today for follow up on joint pain.  Historically, he carries a diagnosis of inflammatory arthritis but none has been observed.  He is doing well, Pain is much better. Feels much better since starting cycling and lifting.    Symptoms are low grade.   Celebrex helps. No SE's with it.    Not having pain in shoulders. Shoulders are feeling better.   Neck is doing well.  Pain scale: 1/10. EMS is 30 min.   The symptoms are intermittent. The problem has not changed. The primary symptoms reported include: pain and stiffness. The following symptoms are not reported:  swelling. The patient assesses the interval disease activity as: stable. The patient's assessment of treatment is: helping greatly. The locations affected since last visit are bilateral shoulder. Pertinent negatives include fever, fatigue, inactivity gelling, eye symptoms, change in vision, sicca complaints, skin lesion(s), chest pain, changing cough, edema, joint swelling and abdominal pain.       Subjective   Review of Systems: Review of Systems   Constitutional:  Negative for chills, fatigue, fever and unexpected weight loss.   HENT:  Positive for postnasal drip. Negative for dental problem, mouth sores, sinus pressure and swollen glands.    Eyes:  Positive for redness. Negative for photophobia and pain.   Respiratory:  Negative for apnea, cough, chest tightness and shortness of breath.    Cardiovascular:  Positive for palpitations. Negative for chest pain and leg swelling.   Gastrointestinal:  Positive for GERD. Negative for abdominal pain, diarrhea and nausea.   Genitourinary:  Negative for dysuria and hematuria.        Impotence     Musculoskeletal:   Positive for arthralgias, myalgias and neck pain.   Skin:  Negative for dry skin, rash, skin lesions and bruise.   Allergic/Immunologic: Positive for environmental allergies.   Neurological:  Negative for dizziness, seizures, syncope, weakness, headache and memory problem.   Hematological:  Negative for adenopathy. Does not bruise/bleed easily.   Psychiatric/Behavioral:  Positive for stress. Negative for sleep disturbance, suicidal ideas and depressed mood. The patient is not nervous/anxious.    All other systems reviewed and are negative.       Medications:   Current Outpatient Medications:     acetaminophen (TYLENOL) 500 MG tablet, Take 2 tablets by mouth As Needed for Mild Pain., Disp: , Rfl:     amitriptyline (ELAVIL) 10 MG tablet, Take 0.5 tablets by mouth At Night As Needed for Sleep., Disp: 45 tablet, Rfl: 1    celecoxib (CeleBREX) 200 MG capsule, Take 1 capsule by mouth Daily As Needed for Mild Pain., Disp: 90 capsule, Rfl: 1    COLLAGEN PO, Take  by mouth., Disp: , Rfl:     FIBER PO, Take  by mouth Every Night. 0.4 gram capsules, Disp: , Rfl:     finasteride (PROPECIA) 1 MG tablet, Take 1 tablet by mouth Daily., Disp: , Rfl:     finasteride (PROSCAR) 5 MG tablet, Take 1 tablet by mouth Daily., Disp: 30 tablet, Rfl: 11    Glucosamine HCl 500 MG tablet, Take  by mouth. As directed, Disp: , Rfl:     Lactobacillus (Probiotic Acidophilus) capsule, Take  by mouth., Disp: , Rfl:     Loratadine 10 MG capsule, Take 1 capsule by mouth Daily As Needed (allergies)., Disp: , Rfl:     Multiple Vitamins-Minerals (MULTIVITAMIN ADULT PO), Take 1 tablet by mouth Daily., Disp: , Rfl:     Omega-3 Fatty Acids (fish oil) 1000 MG capsule capsule, Take  by mouth Daily With Breakfast., Disp: , Rfl:     pravastatin (PRAVACHOL) 20 MG tablet, Take 1 tablet by mouth Daily., Disp: , Rfl:     pravastatin (PRAVACHOL) 80 MG tablet, Take 1 tablet by mouth Daily., Disp: 30 tablet, Rfl: 11    sildenafil (REVATIO) 20 MG tablet, Take 5 tablets  "by mouth Daily As Needed (ED)., Disp: 90 tablet, Rfl: 1    tadalafil (CIALIS) 5 MG tablet, Take 1 tablet by mouth Daily., Disp: 30 tablet, Rfl: 11    Misc Natural Products (Glucosamine Chond Cmp Advanced) tablet, Take  by mouth. (Patient not taking: Reported on 3/18/2025), Disp: , Rfl:     psyllium (METAMUCIL) 58.6 % packet, Take 1 packet by mouth Daily. (Patient not taking: Reported on 3/18/2025), Disp: , Rfl:     Allergies: No Known Allergies    I have reviewed and updated the patient's chief complaint, history of present illness, review of systems, past medical history, surgical history, family history, social history, medications and allergy list as appropriate.     Objective    Vital Signs:   Vitals:    03/18/25 1558   BP: 110/80   BP Location: Left arm   Patient Position: Sitting   Cuff Size: Adult   Pulse: 70   Temp: 97.2 °F (36.2 °C)   Weight: 87.1 kg (192 lb)   Height: 180.3 cm (70.98\")   PainSc: 2    PainLoc: Generalized       Body mass index is 26.79 kg/m².    Physical Exam:  GENERAL: The patient is well developed and well nourished.  Cooperative and oriented times three.  Affect is normal.  Hydration appears normal.    HEENT: Normocephalic and atraumatic.  No notable alopecia.  No facial rash.  Lids and conjunctiva are normal.  Pupils are equal and sclera are clear.   NECK: Neck is supple without adenopathy, masses, or thyromegaly.    LUNGS: Effort is normal.  Lungs are clear without rales or rhonchi.    CARDIOVASCULAR: Normal S1, S2.  No murmurs are heard.    ABDOMEN: Soft and nontender without masses or HSM.  EXTREMITIES: There is no edema.    I see no cyanosis or clubbing.    SKIN: Inspection and palpation are normal.  No rashes are present.    NEUROLOGIC: Gait is normal.   MUSCULOSKELETAL: Complete joint exam was performed.  There is full range of motion of the shoulders, elbows, wrists, and hands without notable deformities, soft tissue swelling, synovitis, or atrophy except as noted.  FROM both " shoulders. Mild degenerative changes in fingers. No swelling. Hips have good flexion and internal and external rotation.  Knees have no palpable effusions.  There is full extension and full flexion of the knees without pain.  Ankles have no soft tissue swelling, synovitis, or major deformities.    BACK: Straight without notable scoliosis.    Joint Exam 03/18/2025     The following joints were examined and normal:   Left Glenohumeral, Right Glenohumeral, Left Elbow, Right Elbow, Left Wrist, Right Wrist, Left MCP 1, Right MCP 1, Left MCP 2, Right MCP 2, Left MCP 3, Right MCP 3, Left MCP 4, Right MCP 4, Left MCP 5, Right MCP 5, Left IP (thumb), Right IP (thumb), Left PIP 2 (finger), Right PIP 2 (finger), Left PIP 3 (finger), Right PIP 3 (finger), Left PIP 4 (finger), Right PIP 4 (finger), Left PIP 5 (finger), Right PIP 5 (finger), Left Knee, Right Knee       Patient Global: 20 / 100  Provider Global: 0 / 100    Assessment / Plan      Assessment & Plan  Inflammatory arthritis  BASIS OF DIAGNOSIS NOT CLEAR. NO REPORTS OF SYNOVITIS IN OLD RECORDS. RF, CCP, NILE, AND HLAB27 ARE ALL NEGATIVE.  * Former patient of Dr. Knox-diagnosis 2001.No DMARDs  * Medications tried include: Celebrex, Salsalate, naproxen.  Has history of negative SI MRI in past..    Doing very well.  No evidence of active IA today.   Shoulders are better since PT and with exercise.   Pt global is 2/10 and VAS 1/10.  He will continue physical therapy exercises and weight lifting for the shoulders.    Continue Celebrex prn.   Labs today.   I will see him in follow-up in 6 months.  Primary osteoarthritis of both hands  Mild  Long term (current) use of non-steroidal anti-inflammatories (nsaid)  Celebrex  Risks of nonsteroidal anti-inflammatory drugs discussed including GI upset, GI bleeding, renal and hepatic risk, and the risks of cardiovascular disease.  Warned not to take with other NSAIDs including over-the-counter NSAIDs.    Status post left hip  replacement  2020.  He has done very well with this.  Primary osteoarthritis of right hip  Apparently mild.  He has good range of motion without pain today.  Chronic pain of both shoulders  Better since physical therapy.    Orders Placed This Encounter   Procedures    Comprehensive Metabolic Panel    CBC Auto Differential       New Medications Ordered This Visit   Medications    celecoxib (CeleBREX) 200 MG capsule     Sig: Take 1 capsule by mouth Daily As Needed for Mild Pain.     Dispense:  90 capsule     Refill:  1            Follow Up:   Return in about 6 months (around 9/18/2025).        Bakari Park MD  [facility] [place] [facility] [place]

## 2025-03-18 NOTE — ASSESSMENT & PLAN NOTE
Celebrex  Risks of nonsteroidal anti-inflammatory drugs discussed including GI upset, GI bleeding, renal and hepatic risk, and the risks of cardiovascular disease.  Warned not to take with other NSAIDs including over-the-counter NSAIDs.

## 2025-03-20 DIAGNOSIS — G47.00 INSOMNIA, UNSPECIFIED TYPE: ICD-10-CM

## 2025-03-21 RX ORDER — AMITRIPTYLINE HYDROCHLORIDE 10 MG/1
TABLET ORAL
Qty: 45 TABLET | Refills: 1 | Status: SHIPPED | OUTPATIENT
Start: 2025-03-21

## 2025-06-18 DIAGNOSIS — N52.9 ERECTILE DYSFUNCTION, UNSPECIFIED ERECTILE DYSFUNCTION TYPE: ICD-10-CM

## 2025-06-18 RX ORDER — TADALAFIL 20 MG/1
20 TABLET ORAL DAILY PRN
Qty: 6 TABLET | Refills: 0 | OUTPATIENT
Start: 2025-06-18

## 2025-07-08 ENCOUNTER — PATIENT MESSAGE (OUTPATIENT)
Dept: FAMILY MEDICINE CLINIC | Facility: CLINIC | Age: 60
End: 2025-07-08
Payer: COMMERCIAL

## 2025-07-08 DIAGNOSIS — N52.9 ERECTILE DYSFUNCTION, UNSPECIFIED ERECTILE DYSFUNCTION TYPE: ICD-10-CM

## 2025-07-09 RX ORDER — SILDENAFIL CITRATE 20 MG/1
100 TABLET ORAL DAILY PRN
Qty: 90 TABLET | Refills: 1 | Status: SHIPPED | OUTPATIENT
Start: 2025-07-09

## 2025-07-10 RX ORDER — TADALAFIL 20 MG/1
20 TABLET ORAL DAILY
Qty: 6 TABLET | Refills: 11 | Status: SHIPPED | OUTPATIENT
Start: 2025-07-10

## 2025-07-16 ENCOUNTER — TELEPHONE (OUTPATIENT)
Dept: FAMILY MEDICINE CLINIC | Facility: CLINIC | Age: 60
End: 2025-07-16
Payer: COMMERCIAL

## 2025-07-16 NOTE — TELEPHONE ENCOUNTER
Submitted prior auth for Sildenafil 20mg via Cover My Meds      Key: [id number]      Awaiting response

## (undated) DEVICE — DRAPE,REIN 53X77,STERILE: Brand: MEDLINE

## (undated) DEVICE — DRSNG SURG AQUACEL AG 9X25CM

## (undated) DEVICE — STERILE PVP: Brand: MEDLINE INDUSTRIES, INC.

## (undated) DEVICE — SYR LUERLOK 30CC

## (undated) DEVICE — PK HIP TOTL UNIV 10

## (undated) DEVICE — SUT MONOCRYL PLS ANTIB UND 3/0  PS1 27IN

## (undated) DEVICE — SPNG GZ WOVN 4X4IN 12PLY 10/BX STRL

## (undated) DEVICE — NEEDLE, QUINCKE 22GX3.5": Brand: MEDLINE INDUSTRIES, INC.

## (undated) DEVICE — HDRST POSTIN FM CRDL TRACH SLOT 9X8X4IN

## (undated) DEVICE — SST TWIST DRILL, STANDARD, 2.4MM DIA. X 127MM: Brand: MICROAIRE®

## (undated) DEVICE — HEWSON SUTURE RETRIEVER: Brand: HEWSON SUTURE RETRIEVER

## (undated) DEVICE — COATED BRAIDED POLYESTER: Brand: TI-CRON

## (undated) DEVICE — GLV SURG TRIUMPH ORTHO W/ALOE PF LTX 8 STRL

## (undated) DEVICE — GLV SURG SIGNATURE TOUCH PF LTX 8 STRL BX/50

## (undated) DEVICE — SKIN AFFIX SURG ADHESIVE 72/CS 0.55ML: Brand: MEDLINE

## (undated) DEVICE — PEG PAD FOR SURG DISP

## (undated) DEVICE — CVR HNDL LT SURG ACCSSRY BLU STRL

## (undated) DEVICE — SUT VIC 1 CTX 36IN OBGYN VCP977H

## (undated) DEVICE — BIT DRL 3.3X40MM

## (undated) DEVICE — ANTIBACTERIAL UNDYED BRAIDED (POLYGLACTIN 910), SYNTHETIC ABSORBABLE SUTURE: Brand: COATED VICRYL

## (undated) DEVICE — GLV SURG PREMIERPRO MIC LTX PF SZ8.5 BRN

## (undated) DEVICE — STRYKER PERFORMANCE SERIES SAGITTAL BLADE: Brand: STRYKER PERFORMANCE SERIES